# Patient Record
Sex: FEMALE | Race: WHITE | NOT HISPANIC OR LATINO
[De-identification: names, ages, dates, MRNs, and addresses within clinical notes are randomized per-mention and may not be internally consistent; named-entity substitution may affect disease eponyms.]

---

## 2019-04-30 ENCOUNTER — APPOINTMENT (OUTPATIENT)
Dept: ORTHOPEDIC SURGERY | Facility: CLINIC | Age: 54
End: 2019-04-30
Payer: COMMERCIAL

## 2019-04-30 VITALS
BODY MASS INDEX: 25.03 KG/M2 | HEART RATE: 65 BPM | DIASTOLIC BLOOD PRESSURE: 70 MMHG | SYSTOLIC BLOOD PRESSURE: 106 MMHG | HEIGHT: 62 IN | WEIGHT: 136 LBS

## 2019-04-30 DIAGNOSIS — M16.12 UNILATERAL PRIMARY OSTEOARTHRITIS, LEFT HIP: ICD-10-CM

## 2019-04-30 DIAGNOSIS — Z78.9 OTHER SPECIFIED HEALTH STATUS: ICD-10-CM

## 2019-04-30 DIAGNOSIS — Z87.39 PERSONAL HISTORY OF OTHER DISEASES OF THE MUSCULOSKELETAL SYSTEM AND CONNECTIVE TISSUE: ICD-10-CM

## 2019-04-30 PROCEDURE — 99213 OFFICE O/P EST LOW 20 MIN: CPT

## 2019-04-30 PROCEDURE — 73522 X-RAY EXAM HIPS BI 3-4 VIEWS: CPT

## 2019-04-30 RX ORDER — LIOTHYRONINE SODIUM 50 UG/1
TABLET ORAL
Refills: 0 | Status: ACTIVE | COMMUNITY

## 2019-04-30 RX ORDER — DICLOFENAC SODIUM 75 MG/1
75 TABLET, DELAYED RELEASE ORAL
Qty: 30 | Refills: 2 | Status: ACTIVE | COMMUNITY
Start: 2019-04-30 | End: 1900-01-01

## 2019-04-30 NOTE — DISCUSSION/SUMMARY
[Medication Risks Reviewed] : Medication risks reviewed [Surgical risks reviewed] : Surgical risks reviewed [de-identified] : Dr. Mahmood evaluated this patient and is guiding this patient's entire orthopedic management plan. The patient was advised that based upon their clinical presentation and radiographic findings they meet inclusion criteria for benefitting from a left total hip arthroplasty as a treatment option for severe arthritis of the hip.\par The spectrum of treatments for severe hip DJD were reviewed in detail. Dr. Mahmood reviewed in detail the goals, alternatives, risks and benefits of total hip arthroplasty. \par The patient was advised of the possible complications which are inclusive of but not exclusive to VTE-related, infectious-related, anesthetic-related, surgically-related, medically-related, dislocation-related, and implant-related. \par The patient was advised that limb length equality may not be assured secondary to variabilities in pelvic malrotation, stable intraoperative fit, opposite side wear, and other anatomic deformities of the lower extremity.\par A non-cemented type hip implant is utilized after consideration of bony integrity intraoperatively. The patient was demonstrated. A model of the total hip replacement. Advised on the brand and preliminary model of the implant that Dr. Mahmood uses. Patient also understands that the preliminary discharge plan would be for the patient to go home in approximately 3 days unless otherwise not cleared by physical therapy\par \par The patient was advised that Dr. Mahmood operate as a surgical team with my associate Dr. VANESSA Phan.\par The patient was advised that they will require a medical preoperative risk evaluation by their PCP. Further medical subspecialty clearances such as cardiac may be indicated if felt needed by their PCP.\par The patient was advised he/she may call our office after careful consideration of their treatment options and further consultation with any other physician to begin the process of preoperative planning for elective left THR at a time of their choosing. \par \par A long discussion was held with the patient in regards to the total joint replacement. Using a model to demonstrate the procedure. The risks, benefits, and alternatives to surgical intervention were discussed at length with the patient hospitalization and rehabilitation were discussed and the patient was made aware of the prophylaxis with antibiotics and the need for postoperative anticoagulation. Specific risks discussed included infection, wound breakdown, numbness, and damage to nerves, tendons, muscles, arteries and blood vessels. The possibility of recurrent pain, no improvement in pain and actual worsening of pain were also mentioned in conversation with the patient medical complications related to the patient's general medical health, including deep vein thrombosis, pulmonary embolism, heart attack, stroke, death, and other complications from anesthesia or we discussed as well. The patient was told that we will take steps to minimize these risks by using sterile technique antibiotics and DVT prophylaxis. When appropriate and to follow the patient in the clinical setting. Postoperatively to monitor her progress. The benefits of surgery were discussed and the patient was included in the conversation regarding potential for improvement of the current clinical condition. Through operative intervention. Alternatives to surgical intervention, including continued conservative management, which may yield less than optimal results in this particular patient, were discussed. All questions were answered to the satisfaction of the patient the patient will consider scheduling a joint replacement with our surgical booking desk, and we'll contact her office if there are any further questions that we may answer for this particular patient.\par \par  Patient will be started on some physical therapy, and a prescription for a one-day anti-inflammatory diclofenac. The patient was advised to call and book a surgical date and she finds that her activities of daily living are significantly affected more than they are now poor. She sees that the anti-inflammatory medication. Is not improving her daily comfort.\par She informed for a left total hip replacement. Will be centered in the chart. The patient sees that she is ready and it is possible for her to accommodate this with her work and family schedule\par \par

## 2019-04-30 NOTE — PHYSICAL EXAM
[Antalgic] : antalgic [LE] : Sensory: Intact in bilateral lower extremities [DP] : dorsalis pedis 2+ and symmetric bilaterally [PT] : posterior tibial 2+ and symmetric bilaterally [Normal RLE] : Right Lower Extremity: No scars, rashes, lesions, ulcers, skin intact [Normal LLE] : Left Lower Extremity: No scars, rashes, lesions, ulcers, skin intact [Normal Touch] : sensation intact for touch [Normal] : no peripheral adenopathy appreciated [de-identified] : Patient ambulates with a slight pelvic tilt. The left side being down patient has minimal discomfort assessing the examining couch on examination, the right hip has full range of motion flexing beyond 110°, extending fully, with 30° external 10°, internal rotation, 30°, abduction the left hip has discomfort in flexing beyond 100° will extend fully. The patient is limited to about 20° of external rotation 10° of internal rotation and has a positive Abdulaziz examination at about 45° of external rotation. Neurovascular status of the lower extremities is within normal limits. No calf tenderness. Good sensation and pulses at the ankle. Good strength against resistance and EHL and dorsiflexion [de-identified] : AP pelvis and lateral of both hips, and a new office today shows a well-positioned well fit. Right total hip replacement. The left hip shows bone-on-bone wear along the superior lateral border of the acetabulum against the femoral head. There is some wear fraction of the inferior portion of the femoral head from debris, and scuffing. There is particulate material visible in the inferior border of the acetabulum. There is some sclerosis of the superior rim of the acetabulum

## 2019-04-30 NOTE — HISTORY OF PRESENT ILLNESS
[de-identified] : Patient with a history of right total hip replacement September of 2014. Now having intermittent left hip pain for about a year or today for an evaluation and advice regarding possible left total hip replacement [Worsening] : worsening [___ mths] : [unfilled] month(s) ago [2] : an average pain level of 2/10 [1] : a minimum pain level of 1/10 [5] : a maximum pain level of 5/10 [Standing] : standing [Intermit.] : ~He/She~ states the symptoms seem to be intermittent [Walking] : worsened by walking [Recumbency] : relieved by recumbency [Rest] : relieved by rest [de-identified] : Minor relief with OTC anti-inflammatories [de-identified] : Minor relief with OTC anti-inflammatories

## 2019-08-26 ENCOUNTER — APPOINTMENT (OUTPATIENT)
Dept: ORTHOPEDIC SURGERY | Facility: CLINIC | Age: 54
End: 2019-08-26
Payer: COMMERCIAL

## 2019-08-26 VITALS
BODY MASS INDEX: 25.76 KG/M2 | HEIGHT: 62 IN | HEART RATE: 85 BPM | DIASTOLIC BLOOD PRESSURE: 91 MMHG | SYSTOLIC BLOOD PRESSURE: 128 MMHG | WEIGHT: 140 LBS

## 2019-08-26 DIAGNOSIS — M16.11 UNILATERAL PRIMARY OSTEOARTHRITIS, RIGHT HIP: ICD-10-CM

## 2019-08-26 PROCEDURE — 73502 X-RAY EXAM HIP UNI 2-3 VIEWS: CPT

## 2019-08-26 PROCEDURE — 99213 OFFICE O/P EST LOW 20 MIN: CPT

## 2019-08-26 RX ORDER — CELECOXIB 200 MG/1
200 CAPSULE ORAL TWICE DAILY
Qty: 60 | Refills: 0 | Status: ACTIVE | COMMUNITY
Start: 2019-08-26 | End: 1900-01-01

## 2019-10-15 ENCOUNTER — OUTPATIENT (OUTPATIENT)
Dept: OUTPATIENT SERVICES | Facility: HOSPITAL | Age: 54
LOS: 1 days | End: 2019-10-15
Payer: COMMERCIAL

## 2019-10-15 VITALS
RESPIRATION RATE: 14 BRPM | HEART RATE: 78 BPM | DIASTOLIC BLOOD PRESSURE: 80 MMHG | TEMPERATURE: 98 F | SYSTOLIC BLOOD PRESSURE: 120 MMHG | WEIGHT: 147.71 LBS | HEIGHT: 62 IN | OXYGEN SATURATION: 99 %

## 2019-10-15 DIAGNOSIS — K58.9 IRRITABLE BOWEL SYNDROME WITHOUT DIARRHEA: ICD-10-CM

## 2019-10-15 DIAGNOSIS — Z98.811 DENTAL RESTORATION STATUS: Chronic | ICD-10-CM

## 2019-10-15 DIAGNOSIS — Z96.641 PRESENCE OF RIGHT ARTIFICIAL HIP JOINT: Chronic | ICD-10-CM

## 2019-10-15 DIAGNOSIS — Z98.49 CATARACT EXTRACTION STATUS, UNSPECIFIED EYE: Chronic | ICD-10-CM

## 2019-10-15 DIAGNOSIS — Z98.890 OTHER SPECIFIED POSTPROCEDURAL STATES: Chronic | ICD-10-CM

## 2019-10-15 DIAGNOSIS — G43.909 MIGRAINE, UNSPECIFIED, NOT INTRACTABLE, WITHOUT STATUS MIGRAINOSUS: ICD-10-CM

## 2019-10-15 DIAGNOSIS — M19.90 UNSPECIFIED OSTEOARTHRITIS, UNSPECIFIED SITE: ICD-10-CM

## 2019-10-15 DIAGNOSIS — E03.9 HYPOTHYROIDISM, UNSPECIFIED: ICD-10-CM

## 2019-10-15 DIAGNOSIS — Z01.818 ENCOUNTER FOR OTHER PREPROCEDURAL EXAMINATION: ICD-10-CM

## 2019-10-15 DIAGNOSIS — F41.9 ANXIETY DISORDER, UNSPECIFIED: ICD-10-CM

## 2019-10-15 DIAGNOSIS — M16.12 UNILATERAL PRIMARY OSTEOARTHRITIS, LEFT HIP: ICD-10-CM

## 2019-10-15 DIAGNOSIS — Z90.49 ACQUIRED ABSENCE OF OTHER SPECIFIED PARTS OF DIGESTIVE TRACT: Chronic | ICD-10-CM

## 2019-10-15 LAB
ALBUMIN SERPL ELPH-MCNC: 3.9 G/DL — SIGNIFICANT CHANGE UP (ref 3.3–5)
ALP SERPL-CCNC: 61 U/L — SIGNIFICANT CHANGE UP (ref 30–120)
ALT FLD-CCNC: 31 U/L DA — SIGNIFICANT CHANGE UP (ref 10–60)
ANION GAP SERPL CALC-SCNC: 7 MMOL/L — SIGNIFICANT CHANGE UP (ref 5–17)
APTT BLD: 28.5 SEC — SIGNIFICANT CHANGE UP (ref 28.5–37)
AST SERPL-CCNC: 24 U/L — SIGNIFICANT CHANGE UP (ref 10–40)
BILIRUB SERPL-MCNC: 1 MG/DL — SIGNIFICANT CHANGE UP (ref 0.2–1.2)
BLD GP AB SCN SERPL QL: SIGNIFICANT CHANGE UP
BUN SERPL-MCNC: 18 MG/DL — SIGNIFICANT CHANGE UP (ref 7–23)
CALCIUM SERPL-MCNC: 9.6 MG/DL — SIGNIFICANT CHANGE UP (ref 8.4–10.5)
CHLORIDE SERPL-SCNC: 103 MMOL/L — SIGNIFICANT CHANGE UP (ref 96–108)
CO2 SERPL-SCNC: 30 MMOL/L — SIGNIFICANT CHANGE UP (ref 22–31)
CREAT SERPL-MCNC: 0.83 MG/DL — SIGNIFICANT CHANGE UP (ref 0.5–1.3)
GLUCOSE SERPL-MCNC: 117 MG/DL — HIGH (ref 70–99)
INR BLD: 1.11 RATIO — SIGNIFICANT CHANGE UP (ref 0.88–1.16)
POTASSIUM SERPL-MCNC: 4.4 MMOL/L — SIGNIFICANT CHANGE UP (ref 3.5–5.3)
POTASSIUM SERPL-SCNC: 4.4 MMOL/L — SIGNIFICANT CHANGE UP (ref 3.5–5.3)
PROT SERPL-MCNC: 7.5 G/DL — SIGNIFICANT CHANGE UP (ref 6–8.3)
PROTHROM AB SERPL-ACNC: 12.1 SEC — SIGNIFICANT CHANGE UP (ref 10–12.9)
SODIUM SERPL-SCNC: 140 MMOL/L — SIGNIFICANT CHANGE UP (ref 135–145)

## 2019-10-15 PROCEDURE — G0463: CPT

## 2019-10-15 NOTE — H&P PST ADULT - NSICDXPROBLEM_GEN_ALL_CORE_FT
PROBLEM DIAGNOSES  Problem: Osteoarthritis  Assessment and Plan: left hip replacement   Medical clearance in chart   Hospitalist will clear the patient PROBLEM DIAGNOSES  Problem: Osteoarthritis  Assessment and Plan: left hip replacement   Medical clearance in chart   Hospitalist will clear the patient   [facility] on admit

## 2019-10-15 NOTE — H&P PST ADULT - NSICDXPASTSURGICALHX_GEN_ALL_CORE_FT
PAST SURGICAL HISTORY:  History of abdominoplasty 2005    History of augmentation of both breasts 2005    History of cataract extraction bilateral, 2019    History of right hip replacement 2015    Knee injury acl reconstruction 1999 left    S/P cholecystectomy 2013    S/P dental restoration dental implant- Sept 5,2014    Umbilical hernia Repair 1995

## 2019-10-15 NOTE — H&P PST ADULT - NSICDXFAMILYHX_GEN_ALL_CORE_FT
FAMILY HISTORY:  Father  Still living? No  Family history of hyperlipidemia, Age at diagnosis: Age Unknown  Family history of lung cancer, Age at diagnosis: Age Unknown  History of skin cancer of unknown type, Age at diagnosis: Age Unknown    Mother  Still living? No  Family history of hyperlipidemia, Age at diagnosis: Age Unknown  Family history of lung cancer, Age at diagnosis: Age Unknown    Sibling  Still living? Yes, Estimated age: 51-60  Family history of hypertension, Age at diagnosis: Age Unknown

## 2019-10-15 NOTE — CONSULT NOTE ADULT - ASSESSMENT
54 y/o female with past hx of hypothyroidism, HTN, HLD, anxiety, s/p cataract repair, OA and knee replacement presented for pre -op surgical testing

## 2019-10-15 NOTE — CONSULT NOTE ADULT - SUBJECTIVE AND OBJECTIVE BOX
This is a 54 y/o female with past hx of hypothyroidism, HTN, HLD, anxiety, s/p cataract repair, OA and knee replacement presented for pre -op surgical testing. patient is doing well she is up to date on all vaccinations.     MEDICATIONS  (STANDING):    MEDICATIONS  (PRN):      REVIEW OF SYSTEMS:  See HPI,  all others negative    PHYSICAL EXAM:  Vital Signs Last 24 Hrs  T(C): 36.7 (15 Oct 2019 12:02), Max: 36.7 (15 Oct 2019 12:02)  T(F): 98 (15 Oct 2019 12:02), Max: 98 (15 Oct 2019 12:02)  HR: 78 (15 Oct 2019 12:02) (78 - 78)  BP: 120/80 (15 Oct 2019 12:02) (120/80 - 120/80)  BP(mean): 93 (15 Oct 2019 12:02) (93 - 93)  RR: 14 (15 Oct 2019 12:02) (14 - 14)  SpO2: 99% (15 Oct 2019 12:02) (99% - 99%)    GENERAL: NAD, well-groomed, well-developed, awake, alert, oriented x 3, fluent and coherent speech  HEAD:  Atraumatic, Normocephalic  EYES: EOMI, PERRLA, conjunctiva and sclera clear  ENMT: No tonsillar erythema, exudates, or enlargement; Moist mucous membranes, Good dentition, No lesions  NECK: Supple, No JVD, No Cervical LAD, No thyromegaly, No thyroid nodules felt  NERVOUS SYSTEM:  Good concentration; Moving all 4 extremities; No gross sensory deficits, No facial droop  CHEST WALL: No masses  CHEST/LUNG: Clear to auscultation bilaterally; No rales, rhonchi, wheezing, or rubs  HEART: Regular rate and rhythm; No murmurs, rubs, or gallops  ABDOMEN: Soft, Nontender, Nondistended, Bowel sounds present, No palpable masses or organomegaly, No bruits  EXTREMITIES:  2+ Peripheral Pulses, No clubbing, cyanosis, or edema  LYMPH: No lymphadenopathy  SKIN: No rashes or lesions    LABS:    15 Oct 2019 12:35    140    |  103    |  18     ----------------------------<  117    4.4     |  30     |  0.83     Ca    9.6        15 Oct 2019 12:35    TPro  7.5    /  Alb  3.9    /  TBili  1.0    /  DBili  x      /  AST  24     /  ALT  31     /  AlkPhos  61     15 Oct 2019 12:35    PT/INR - ( 15 Oct 2019 12:35 )   PT: 12.1 sec;   INR: 1.11 ratio         PTT - ( 15 Oct 2019 12:35 )  PTT:28.5 sec       RADIOLOGY & ADDITIONAL TESTS:

## 2019-10-15 NOTE — H&P PST ADULT - RS GEN PE MLT RESP DETAILS PC
clear to auscultation bilaterally/breath sounds equal clear to auscultation bilaterally/breath sounds equal/no wheezes/airway obstructed

## 2019-10-15 NOTE — H&P PST ADULT - NEUROLOGICAL DETAILS
alert and oriented x 3/responds to verbal commands/sensation intact/deep reflexes intact/responds to pain

## 2019-10-15 NOTE — H&P PST ADULT - NSANTHOSAYNRD_GEN_A_CORE
No. PATTIE screening performed.  STOP BANG Legend: 0-2 = LOW Risk; 3-4 = INTERMEDIATE Risk; 5-8 = HIGH Risk

## 2019-10-15 NOTE — H&P PST ADULT - MUSCULOSKELETAL
details… detailed exam decreased ROM due to pain/no joint erythema/no calf tenderness/no joint warmth no joint warmth/no calf tenderness/joint erythema/decreased ROM due to pain/joint swelling/no joint erythema

## 2019-10-15 NOTE — H&P PST ADULT - NSICDXPASTMEDICALHX_GEN_ALL_CORE_FT
PAST MEDICAL HISTORY:  Anxiety     Chronic constipation     Hypothyroid     Hypothyroidism     Migraines     Osteoarthritis of left hip PAST MEDICAL HISTORY:  Anxiety     Chronic constipation     Hypothyroidism     IBS (irritable bowel syndrome)     Migraines     Osteoarthritis of left hip

## 2019-10-15 NOTE — H&P PST ADULT - HISTORY OF PRESENT ILLNESS
47yo female presents to PST with chief complaint of right hip osteoarthritis with pain for 4 years. Pain scale is 7/10 right now and 10/10 with stair climbing or laying down.  Pt states that advil helps with the pain sometimes.  she has not tried PT or injections.    The PMH is significant for hypothyroidism, anxiety, depression and migraines.    She is scheduled for "right total hip" replacement on 9/17/14 with Dr Delfino Mahmood. 52 yo female presents to PST with 2 year history of left hip pain with radiation to groin. Reports  wprse pain with stair climbing or laying down.  Pt states that advil helps with the pain sometimes.  scheduled for left hip replacement 54 yo female presents to PST with 2 year history of left hip pain with radiation to groin. Reports constant pain and worse pain with stair climbing and laying down.  Pt states that Advil helps with the pain sometimes.  scheduled for left hip replacement  10/28/19

## 2019-10-16 LAB
MRSA PCR RESULT.: SIGNIFICANT CHANGE UP
S AUREUS DNA NOSE QL NAA+PROBE: SIGNIFICANT CHANGE UP

## 2019-10-19 PROBLEM — K58.9 IRRITABLE BOWEL SYNDROME WITHOUT DIARRHEA: Chronic | Status: ACTIVE | Noted: 2019-10-15

## 2019-10-19 PROBLEM — K59.09 OTHER CONSTIPATION: Chronic | Status: ACTIVE | Noted: 2019-10-10

## 2019-10-19 PROBLEM — M16.12 UNILATERAL PRIMARY OSTEOARTHRITIS, LEFT HIP: Chronic | Status: ACTIVE | Noted: 2019-10-10

## 2019-10-19 PROBLEM — E03.9 HYPOTHYROIDISM, UNSPECIFIED: Chronic | Status: ACTIVE | Noted: 2019-10-10

## 2019-10-23 NOTE — PROGRESS NOTE ADULT - ASSESSMENT
Presurgical evaluation:  1.	IV TXA  2.	Acetaminophen for pain management. Celecoxib 200mg q12h for HO prevention/pain management  3.	Caution if patient requires sumatriptan while here – increased risk of serotonin syndrome with concurrent opiates  4.	Chronic constipation – proactive prevention/treatment with stool softener and laxatives (docusate now nonformulary – pharmacy will have to enter)  5.	VTE prophylaxis per Caprini score

## 2019-10-23 NOTE — PROGRESS NOTE ADULT - SUBJECTIVE AND OBJECTIVE BOX
Admission medication reconciliation/Presurgical evaluation:    No Known Allergies:     Home Medications:   · 	ALPRAZolam 0.25 mg 3 times a day, As needed, anxiety  · 	Sumatriptan (Imitrex) 100 mg once a day, As Needed  · 	levothyroxine 75 mcg once a day  · 	liothyronine 5 mcg once a day  · 	Vitamin D3 50,000 intl units once a week on Wednesdays  · 	Erenumab-aooe (Aimovig) 70 mg/mL subcutaneous once a month – patient will hold until after surgery  · 	Nasacort Allergy 24HR 55 mcg/inh, 2 sprays nasal once a day  · 	vitamin E 400 intl units once a day  · 	Ginkgo Biloba   · 	multivitamin   · 	magnesium [id number] once a day    PAST MEDICAL HISTORY:  Anxiety   Chronic constipation   Hypothyroidism   IBS (irritable bowel syndrome)   Migraines   Osteoarthritis of left hip    PAST SURGICAL HISTORY:  History of abdominoplasty 2005  History of augmentation of both breasts 2005  History of cataract extraction bilateral, 2019  History of right hip replacement 2015  Knee injury acl reconstruction 1999 left  S/P cholecystectomy 2013  S/P dental restoration dental implant- Sept 5,2014  Umbilical hernia Repair 1995    PST values of interest:  SCr 0.83 mg/dL  LFTs WNL

## 2019-10-28 ENCOUNTER — INPATIENT (INPATIENT)
Facility: HOSPITAL | Age: 54
LOS: 1 days | Discharge: HOME | DRG: 470 | End: 2019-10-30
Attending: ORTHOPAEDIC SURGERY | Admitting: ORTHOPAEDIC SURGERY
Payer: COMMERCIAL

## 2019-10-28 ENCOUNTER — TRANSCRIPTION ENCOUNTER (OUTPATIENT)
Age: 54
End: 2019-10-28

## 2019-10-28 ENCOUNTER — RESULT REVIEW (OUTPATIENT)
Age: 54
End: 2019-10-28

## 2019-10-28 ENCOUNTER — APPOINTMENT (OUTPATIENT)
Dept: ORTHOPEDIC SURGERY | Facility: HOSPITAL | Age: 54
End: 2019-10-28

## 2019-10-28 VITALS
OXYGEN SATURATION: 99 % | TEMPERATURE: 98 F | HEART RATE: 71 BPM | WEIGHT: 148.81 LBS | SYSTOLIC BLOOD PRESSURE: 112 MMHG | RESPIRATION RATE: 20 BRPM | DIASTOLIC BLOOD PRESSURE: 70 MMHG | HEIGHT: 63 IN

## 2019-10-28 DIAGNOSIS — Z98.811 DENTAL RESTORATION STATUS: Chronic | ICD-10-CM

## 2019-10-28 DIAGNOSIS — Z98.890 OTHER SPECIFIED POSTPROCEDURAL STATES: Chronic | ICD-10-CM

## 2019-10-28 DIAGNOSIS — Z90.49 ACQUIRED ABSENCE OF OTHER SPECIFIED PARTS OF DIGESTIVE TRACT: Chronic | ICD-10-CM

## 2019-10-28 DIAGNOSIS — Z96.641 PRESENCE OF RIGHT ARTIFICIAL HIP JOINT: Chronic | ICD-10-CM

## 2019-10-28 DIAGNOSIS — M16.12 UNILATERAL PRIMARY OSTEOARTHRITIS, LEFT HIP: ICD-10-CM

## 2019-10-28 DIAGNOSIS — Z98.49 CATARACT EXTRACTION STATUS, UNSPECIFIED EYE: Chronic | ICD-10-CM

## 2019-10-28 DIAGNOSIS — Z01.818 ENCOUNTER FOR OTHER PREPROCEDURAL EXAMINATION: ICD-10-CM

## 2019-10-28 LAB
ANION GAP SERPL CALC-SCNC: 8 MMOL/L — SIGNIFICANT CHANGE UP (ref 5–17)
BUN SERPL-MCNC: 12 MG/DL — SIGNIFICANT CHANGE UP (ref 7–23)
CALCIUM SERPL-MCNC: 9.1 MG/DL — SIGNIFICANT CHANGE UP (ref 8.4–10.5)
CHLORIDE SERPL-SCNC: 105 MMOL/L — SIGNIFICANT CHANGE UP (ref 96–108)
CO2 SERPL-SCNC: 25 MMOL/L — SIGNIFICANT CHANGE UP (ref 22–31)
CREAT SERPL-MCNC: 0.8 MG/DL — SIGNIFICANT CHANGE UP (ref 0.5–1.3)
GLUCOSE SERPL-MCNC: 153 MG/DL — HIGH (ref 70–99)
HCG UR QL: NEGATIVE — SIGNIFICANT CHANGE UP
HCT VFR BLD CALC: 37 % — SIGNIFICANT CHANGE UP (ref 34.5–45)
HGB BLD-MCNC: 11.9 G/DL — SIGNIFICANT CHANGE UP (ref 11.5–15.5)
MCHC RBC-ENTMCNC: 30.8 PG — SIGNIFICANT CHANGE UP (ref 27–34)
MCHC RBC-ENTMCNC: 32.2 GM/DL — SIGNIFICANT CHANGE UP (ref 32–36)
MCV RBC AUTO: 95.9 FL — SIGNIFICANT CHANGE UP (ref 80–100)
NRBC # BLD: 0 /100 WBCS — SIGNIFICANT CHANGE UP (ref 0–0)
PLATELET # BLD AUTO: 198 K/UL — SIGNIFICANT CHANGE UP (ref 150–400)
POTASSIUM SERPL-MCNC: 4.1 MMOL/L — SIGNIFICANT CHANGE UP (ref 3.5–5.3)
POTASSIUM SERPL-SCNC: 4.1 MMOL/L — SIGNIFICANT CHANGE UP (ref 3.5–5.3)
RBC # BLD: 3.86 M/UL — SIGNIFICANT CHANGE UP (ref 3.8–5.2)
RBC # FLD: 13.4 % — SIGNIFICANT CHANGE UP (ref 10.3–14.5)
SODIUM SERPL-SCNC: 138 MMOL/L — SIGNIFICANT CHANGE UP (ref 135–145)
WBC # BLD: 10.39 K/UL — SIGNIFICANT CHANGE UP (ref 3.8–10.5)
WBC # FLD AUTO: 10.39 K/UL — SIGNIFICANT CHANGE UP (ref 3.8–10.5)

## 2019-10-28 PROCEDURE — 99223 1ST HOSP IP/OBS HIGH 75: CPT

## 2019-10-28 PROCEDURE — 27130 TOTAL HIP ARTHROPLASTY: CPT | Mod: 82,LT

## 2019-10-28 PROCEDURE — 88305 TISSUE EXAM BY PATHOLOGIST: CPT | Mod: 26

## 2019-10-28 PROCEDURE — 27130 TOTAL HIP ARTHROPLASTY: CPT | Mod: LT

## 2019-10-28 PROCEDURE — 73502 X-RAY EXAM HIP UNI 2-3 VIEWS: CPT | Mod: 26,LT

## 2019-10-28 PROCEDURE — 88311 DECALCIFY TISSUE: CPT | Mod: 26

## 2019-10-28 RX ORDER — PANTOPRAZOLE SODIUM 20 MG/1
1 TABLET, DELAYED RELEASE ORAL
Qty: 30 | Refills: 1
Start: 2019-10-28 | End: 2019-12-26

## 2019-10-28 RX ORDER — HYDROMORPHONE HYDROCHLORIDE 2 MG/ML
0.5 INJECTION INTRAMUSCULAR; INTRAVENOUS; SUBCUTANEOUS
Refills: 0 | Status: DISCONTINUED | OUTPATIENT
Start: 2019-10-28 | End: 2019-10-28

## 2019-10-28 RX ORDER — LIOTHYRONINE SODIUM 25 UG/1
1 TABLET ORAL
Qty: 0 | Refills: 0 | DISCHARGE

## 2019-10-28 RX ORDER — SENNA PLUS 8.6 MG/1
2 TABLET ORAL
Qty: 0 | Refills: 0 | DISCHARGE
Start: 2019-10-28

## 2019-10-28 RX ORDER — APREPITANT 80 MG/1
40 CAPSULE ORAL ONCE
Refills: 0 | Status: COMPLETED | OUTPATIENT
Start: 2019-10-28 | End: 2019-10-28

## 2019-10-28 RX ORDER — LIOTHYRONINE SODIUM 25 UG/1
5 TABLET ORAL DAILY
Refills: 0 | Status: DISCONTINUED | OUTPATIENT
Start: 2019-10-28 | End: 2019-10-30

## 2019-10-28 RX ORDER — MAGNESIUM CHLORIDE
1500 CRYSTALS MISCELLANEOUS
Qty: 0 | Refills: 0 | DISCHARGE

## 2019-10-28 RX ORDER — ACETAMINOPHEN 500 MG
2 TABLET ORAL
Qty: 0 | Refills: 0 | DISCHARGE
Start: 2019-10-28 | End: 2019-11-11

## 2019-10-28 RX ORDER — PANTOPRAZOLE SODIUM 20 MG/1
40 TABLET, DELAYED RELEASE ORAL
Refills: 0 | Status: DISCONTINUED | OUTPATIENT
Start: 2019-10-28 | End: 2019-10-30

## 2019-10-28 RX ORDER — TRANEXAMIC ACID 100 MG/ML
1000 INJECTION, SOLUTION INTRAVENOUS ONCE
Refills: 0 | Status: COMPLETED | OUTPATIENT
Start: 2019-10-28 | End: 2019-10-28

## 2019-10-28 RX ORDER — ACETAMINOPHEN 500 MG
1000 TABLET ORAL ONCE
Refills: 0 | Status: COMPLETED | OUTPATIENT
Start: 2019-10-28 | End: 2019-10-28

## 2019-10-28 RX ORDER — CELECOXIB 200 MG/1
1 CAPSULE ORAL
Qty: 60 | Refills: 0
Start: 2019-10-28 | End: 2019-11-26

## 2019-10-28 RX ORDER — CHLORHEXIDINE GLUCONATE 213 G/1000ML
1 SOLUTION TOPICAL ONCE
Refills: 0 | Status: COMPLETED | OUTPATIENT
Start: 2019-10-28 | End: 2019-10-28

## 2019-10-28 RX ORDER — CHOLECALCIFEROL (VITAMIN D3) 125 MCG
0 CAPSULE ORAL
Qty: 0 | Refills: 0 | DISCHARGE

## 2019-10-28 RX ORDER — VITAMIN E 100 UNIT
1 CAPSULE ORAL
Qty: 0 | Refills: 0 | DISCHARGE

## 2019-10-28 RX ORDER — ASPIRIN/CALCIUM CARB/MAGNESIUM 324 MG
81 TABLET ORAL EVERY 12 HOURS
Refills: 0 | Status: DISCONTINUED | OUTPATIENT
Start: 2019-10-29 | End: 2019-10-30

## 2019-10-28 RX ORDER — CELECOXIB 200 MG/1
200 CAPSULE ORAL EVERY 12 HOURS
Refills: 0 | Status: DISCONTINUED | OUTPATIENT
Start: 2019-10-28 | End: 2019-10-30

## 2019-10-28 RX ORDER — ALPRAZOLAM 0.25 MG
0.25 TABLET ORAL THREE TIMES A DAY
Refills: 0 | Status: DISCONTINUED | OUTPATIENT
Start: 2019-10-28 | End: 2019-10-30

## 2019-10-28 RX ORDER — ERENUMAB-AOOE 70 MG/ML
0 INJECTION SUBCUTANEOUS
Qty: 0 | Refills: 0 | DISCHARGE

## 2019-10-28 RX ORDER — ACETAMINOPHEN 500 MG
1000 TABLET ORAL EVERY 6 HOURS
Refills: 0 | Status: COMPLETED | OUTPATIENT
Start: 2019-10-28 | End: 2019-10-29

## 2019-10-28 RX ORDER — OXYCODONE HYDROCHLORIDE 5 MG/1
10 TABLET ORAL
Refills: 0 | Status: DISCONTINUED | OUTPATIENT
Start: 2019-10-28 | End: 2019-10-29

## 2019-10-28 RX ORDER — POLYETHYLENE GLYCOL 3350 17 G/17G
17 POWDER, FOR SOLUTION ORAL
Qty: 0 | Refills: 0 | DISCHARGE
Start: 2019-10-28

## 2019-10-28 RX ORDER — SUMATRIPTAN SUCCINATE 4 MG/.5ML
100 INJECTION, SOLUTION SUBCUTANEOUS DAILY
Refills: 0 | Status: DISCONTINUED | OUTPATIENT
Start: 2019-10-28 | End: 2019-10-30

## 2019-10-28 RX ORDER — OXYCODONE HYDROCHLORIDE 5 MG/1
5 TABLET ORAL
Refills: 0 | Status: DISCONTINUED | OUTPATIENT
Start: 2019-10-28 | End: 2019-10-29

## 2019-10-28 RX ORDER — ONDANSETRON 8 MG/1
4 TABLET, FILM COATED ORAL EVERY 6 HOURS
Refills: 0 | Status: DISCONTINUED | OUTPATIENT
Start: 2019-10-28 | End: 2019-10-30

## 2019-10-28 RX ORDER — POLYETHYLENE GLYCOL 3350 17 G/17G
17 POWDER, FOR SOLUTION ORAL AT BEDTIME
Refills: 0 | Status: DISCONTINUED | OUTPATIENT
Start: 2019-10-28 | End: 2019-10-30

## 2019-10-28 RX ORDER — CEFAZOLIN SODIUM 1 G
2000 VIAL (EA) INJECTION EVERY 8 HOURS
Refills: 0 | Status: COMPLETED | OUTPATIENT
Start: 2019-10-28 | End: 2019-10-28

## 2019-10-28 RX ORDER — SODIUM CHLORIDE 9 MG/ML
1000 INJECTION, SOLUTION INTRAVENOUS
Refills: 0 | Status: DISCONTINUED | OUTPATIENT
Start: 2019-10-28 | End: 2019-10-28

## 2019-10-28 RX ORDER — ACETAMINOPHEN 500 MG
1000 TABLET ORAL EVERY 8 HOURS
Refills: 0 | Status: DISCONTINUED | OUTPATIENT
Start: 2019-10-29 | End: 2019-10-30

## 2019-10-28 RX ORDER — GINKGO BILOBA 40 MG
0 CAPSULE ORAL
Qty: 0 | Refills: 0 | DISCHARGE

## 2019-10-28 RX ORDER — CEFAZOLIN SODIUM 1 G
2000 VIAL (EA) INJECTION ONCE
Refills: 0 | Status: COMPLETED | OUTPATIENT
Start: 2019-10-28 | End: 2019-10-28

## 2019-10-28 RX ORDER — MAGNESIUM HYDROXIDE 400 MG/1
30 TABLET, CHEWABLE ORAL DAILY
Refills: 0 | Status: DISCONTINUED | OUTPATIENT
Start: 2019-10-28 | End: 2019-10-30

## 2019-10-28 RX ORDER — HYDROMORPHONE HYDROCHLORIDE 2 MG/ML
0.5 INJECTION INTRAMUSCULAR; INTRAVENOUS; SUBCUTANEOUS
Refills: 0 | Status: DISCONTINUED | OUTPATIENT
Start: 2019-10-28 | End: 2019-10-30

## 2019-10-28 RX ORDER — TRIAMCINOLONE ACETONIDE 55 MCG
2 AEROSOL, SPRAY (GRAM) NASAL
Qty: 0 | Refills: 0 | DISCHARGE

## 2019-10-28 RX ORDER — ASPIRIN/CALCIUM CARB/MAGNESIUM 324 MG
1 TABLET ORAL
Qty: 82 | Refills: 0
Start: 2019-10-28 | End: 2019-12-07

## 2019-10-28 RX ORDER — LEVOTHYROXINE SODIUM 125 MCG
1 TABLET ORAL
Qty: 0 | Refills: 0 | DISCHARGE

## 2019-10-28 RX ORDER — LEVOTHYROXINE SODIUM 125 MCG
75 TABLET ORAL DAILY
Refills: 0 | Status: DISCONTINUED | OUTPATIENT
Start: 2019-10-28 | End: 2019-10-30

## 2019-10-28 RX ORDER — ONDANSETRON 8 MG/1
4 TABLET, FILM COATED ORAL ONCE
Refills: 0 | Status: DISCONTINUED | OUTPATIENT
Start: 2019-10-28 | End: 2019-10-28

## 2019-10-28 RX ORDER — SENNA PLUS 8.6 MG/1
2 TABLET ORAL AT BEDTIME
Refills: 0 | Status: DISCONTINUED | OUTPATIENT
Start: 2019-10-28 | End: 2019-10-30

## 2019-10-28 RX ADMIN — Medication 400 MILLIGRAM(S): at 16:09

## 2019-10-28 RX ADMIN — OXYCODONE HYDROCHLORIDE 10 MILLIGRAM(S): 5 TABLET ORAL at 21:44

## 2019-10-28 RX ADMIN — CELECOXIB 200 MILLIGRAM(S): 200 CAPSULE ORAL at 21:12

## 2019-10-28 RX ADMIN — CHLORHEXIDINE GLUCONATE 1 APPLICATION(S): 213 SOLUTION TOPICAL at 06:15

## 2019-10-28 RX ADMIN — APREPITANT 40 MILLIGRAM(S): 80 CAPSULE ORAL at 06:15

## 2019-10-28 RX ADMIN — Medication 100 MILLIGRAM(S): at 23:50

## 2019-10-28 RX ADMIN — Medication 100 MILLIGRAM(S): at 16:12

## 2019-10-28 RX ADMIN — OXYCODONE HYDROCHLORIDE 10 MILLIGRAM(S): 5 TABLET ORAL at 13:44

## 2019-10-28 RX ADMIN — OXYCODONE HYDROCHLORIDE 10 MILLIGRAM(S): 5 TABLET ORAL at 14:35

## 2019-10-28 RX ADMIN — Medication 400 MILLIGRAM(S): at 21:12

## 2019-10-28 RX ADMIN — OXYCODONE HYDROCHLORIDE 10 MILLIGRAM(S): 5 TABLET ORAL at 17:31

## 2019-10-28 RX ADMIN — HYDROMORPHONE HYDROCHLORIDE 0.5 MILLIGRAM(S): 2 INJECTION INTRAMUSCULAR; INTRAVENOUS; SUBCUTANEOUS at 23:52

## 2019-10-28 RX ADMIN — Medication 1000 MILLIGRAM(S): at 16:11

## 2019-10-28 RX ADMIN — OXYCODONE HYDROCHLORIDE 10 MILLIGRAM(S): 5 TABLET ORAL at 21:14

## 2019-10-28 RX ADMIN — SODIUM CHLORIDE 75 MILLILITER(S): 9 INJECTION, SOLUTION INTRAVENOUS at 10:49

## 2019-10-28 NOTE — DISCHARGE NOTE PROVIDER - INSTRUCTIONS
regular diet  Pain medicine has been prescribed for you, as needed, and it often causes constipation.    For Constipation :   • Increase your water intake. Drink at least 8 glasses of water daily.  • Try adding fiber to your diet by eating fruits, vegetables and foods that are rich in grains.  • If you do experience constipation, you may take an over-the-counter stool softener/laxative such as Colace, Senokot or  Milk of Magnesia. Regular diet  Pain medicine has been prescribed for you, as needed, and it often causes constipation.    For Constipation :   • Increase your water intake. Drink at least 8 glasses of water daily.  • Try adding fiber to your diet by eating fruits, vegetables and foods that are rich in grains.  • If you do experience constipation, you may take an over-the-counter stool softener/laxative such as Colace, Senokot or  Milk of Magnesia.

## 2019-10-28 NOTE — PROGRESS NOTE ADULT - SUBJECTIVE AND OBJECTIVE BOX
EMILIE ROMAN [id number]    Pt is a 54y year old Female s/p left THR. pain is 1/10  Denies chest pain/shortness of breath/nausea/vomitting.     T(C): 36.4 (10-28-19 @ 09:44), Max: 36.5 (10-28-19 @ 06:17)  HR: 71 (10-28-19 @ 11:54) (64 - 76)  BP: 101/68 (10-28-19 @ 13:53) (85/45 - 119/63)  RR: 18 (10-28-19 @ 11:54) (12 - 20)  SpO2: 99% (10-28-19 @ 11:54) (96% - 99%)  Wt(kg): --      10-28 @ 07:01  -  10-28 @ 13:57  --------------------------------------------------------  IN: 1150 mL / OUT: 200 mL / NET: 950 mL        PE: Lungs: clear       Cor: reg rate,rhythm  Left LE: Dressing CDI, SILT distally,  EHL intact PAS on.     A: 54y year old Female s/p left THR POD#0    Plan:   -DVT ASA 81 mg q 12   -PT/OT = OOB  -Hip dislocation precautions  -Pain control   -Medicine to follow   -continue antibiotics as per SCIP protocol  -Follow up Labs  -Incentive spirometry, continue use of PAS

## 2019-10-28 NOTE — DISCHARGE NOTE PROVIDER - NSDCCPCAREPLAN_GEN_ALL_CORE_FT
PRINCIPAL DISCHARGE DIAGNOSIS  Diagnosis: Primary osteoarthritis of left hip  Assessment and Plan of Treatment: Physical Therapy /Occupational Therapy  Total Hip Protocol   - Ambulation  - Transfers   - Stairs   - ADLs (activities of daily living)  Posterior Total Hip Replacement precautions for 4 weeks  - Abduction pillow   - High hip chair for sitting  - No internal rotation,   - No crossing legs   - No sleeping on opposite sides  - Ice packs to hip following Physical Therapy PRINCIPAL DISCHARGE DIAGNOSIS  Diagnosis: Primary osteoarthritis of left hip  Assessment and Plan of Treatment: Physical Therapy /Occupational Therapy  Total Hip Protocol   - Ambulation  - Transfers   - Stairs   - ADLs (activities of daily living)  Posterior Total Hip Replacement precautions for 4 weeks  - Abduction pillow   - High hip chair for sitting  - No internal rotation,   - No crossing legs   - No sleeping on opposite sides  - Ice packs to hip following Physical Therapy  Use silvadene cream on left hip skin tears twice a day.

## 2019-10-28 NOTE — DISCHARGE NOTE PROVIDER - NSDCCPTREATMENT_GEN_ALL_CORE_FT
PRINCIPAL PROCEDURE  Procedure: Total left hip arthroplasty  Findings and Treatment: severe left hip DJD

## 2019-10-28 NOTE — DISCHARGE NOTE PROVIDER - NSDCFUADDINST_GEN_ALL_CORE_FT
- Call your doctor if you experience:  • An increase in pain not controlled by pain medication or change in activity or  position.  • Temperature greater than 101° F.  • Redness, increased swelling or foul smelling drainage from or around the  incision.  • Numbness, tingling or a change in color or temperature of the operative leg.  • Call your doctor immediately if you experience chest pain, shortness of breath or calf pain. - Call your doctor if you experience:  • An increase in pain not controlled by pain medication or change in activity or  position.  • Temperature greater than 101° F.  • Redness, increased swelling or foul smelling drainage from or around the  incision.  • Numbness, tingling or a change in color or temperature of the operative leg.  • Call your doctor immediately if you experience chest pain, shortness of breath or calf pain.    It is advisable to follow up with your primary care provider within the next 2-3 weeks to ensure your medications are appropriate and there are no underlying problems after your procedure.

## 2019-10-28 NOTE — DISCHARGE NOTE PROVIDER - NSDCACTIVITY_GEN_ALL_CORE
Walking - Outdoors allowed/Walking - Indoors allowed/Stairs allowed/No heavy lifting/straining/Do not drive or operate machinery Walking - Outdoors allowed/Stairs allowed/Walking - Indoors allowed/No heavy lifting/straining/Do not drive or operate machinery/Showering allowed

## 2019-10-28 NOTE — CONSULT NOTE ADULT - SUBJECTIVE AND OBJECTIVE BOX
55 yo F with hx of  Hypothyroidism, IBS, anxiety and ,migraines admitted to  for left total hip arthroplasty  Initial History:    Baseline hx::    Perioperative Course: uneventful    As of now: patient doing well        PAST MEDICAL & SURGICAL HISTORY:  IBS (irritable bowel syndrome)  Hypothyroidism  Chronic constipation  Osteoarthritis of left hip  Anxiety  Migraines  History of cataract extraction: bilateral, 2019  History of abdominoplasty: 2005  History of augmentation of both breasts: 2005  History of right hip replacement: 2015  S/P dental restoration: dental implant- Sept 5,2014  S/P cholecystectomy: 2013  Knee injury: acl reconstruction 1999 left  Umbilical hernia: Repair 1995      FAMILY HISTORY:  History of skin cancer of unknown type (Father)  Family history of hypertension (Sibling)  Family history of hyperlipidemia (Father, Mother)  Family history of lung cancer (Father, Mother)      Allergies    No Known Allergies    Intolerances        Review of Systems:  General:denies fever chills, headache, weakness  HEENT: denies blurry vision,diffculty swallowing,  Cardiovascular: denies chest pain  ,palpitatins  Pulmonary:denies shortness of breath, cough, wheezing  Gastrointestinal: denies abdominal pain, constipation, diarrhra  : denies hematuria, dysuria  Neurological: denies weakness, numbness , tingling, dizziness  skin: denies skin rash  Psychiatrical: denies mood disturbances    Home Medications:  Aimovig 70 mg/mL subcutaneous solution: subcutaneous once a month (28 Oct 2019 06:13)  ALPRAZolam 0.25 mg oral tablet: 1 tab(s) orally 3 times a day, As needed, anxiety (28 Oct 2019 06:13)  Ginkgo Biloba oral tablet:  (28 Oct 2019 06:13)  Imitrex 100 mg oral tablet: 1 tab(s) orally once a day, As Needed (28 Oct 2019 06:13)  levothyroxine 75 mcg (0.075 mg) oral tablet: 1 tab(s) orally once a day (28 Oct 2019 06:13)  liothyronine 5 mcg oral tablet: 1 tab(s) orally once a day (28 Oct 2019 06:13)  magnesium chloride: 1500 milligram(s) orally once a day (28 Oct 2019 06:13)  multimineral:  (28 Oct 2019 06:13)  Nasacort Allergy 24HR 55 mcg/inh nasal spray: 2 spray(s) nasal once a day (28 Oct 2019 06:13)  Vitamin D3 50,000 intl units oral capsule: orally once a week (28 Oct 2019 06:13)  vitamin E 400 intl units oral capsule: 1 cap(s) orally once a day (28 Oct 2019 06:13)        Objective:  Vitals  T(C): 36.4 (10-28-19 @ 09:44), Max: 36.5 (10-28-19 @ 06:17)  HR: 71 (10-28-19 @ 11:54) (64 - 76)  BP: 119/63 (10-28-19 @ 11:54) (85/45 - 119/63)  RR: 18 (10-28-19 @ 11:54) (12 - 20)  SpO2: 99% (10-28-19 @ 11:54) (96% - 99%)    Physical Exam:  General: comfortable, no acute distress, well nourished  HEENT: no LAD, trachea midline  Cardiovascular: normal s1s2, no mrg  Pulmonary: CTA Bilaterally, no wheezing , rhonchi  Gastrointestinal: soft non tender non distended, no masses felt  Muscloskeletal: no lower extremity edema  Neurological: CN II-12 intact. No focal weakness  Psychiatrical: normal mood, cooperative    Labs:                    Active Medications  MEDICATIONS  (STANDING):  acetaminophen  IVPB .. 1000 milliGRAM(s) IV Intermittent every 6 hours  ceFAZolin   IVPB 2000 milliGRAM(s) IV Intermittent every 8 hours  celecoxib 200 milliGRAM(s) Oral every 12 hours  levothyroxine 75 MICROGram(s) Oral daily  liothyronine 5 MICROGram(s) Oral daily  pantoprazole    Tablet 40 milliGRAM(s) Oral before breakfast  polyethylene glycol 3350 17 Gram(s) Oral daily    MEDICATIONS  (PRN):  ALPRAZolam 0.25 milliGRAM(s) Oral three times a day PRN anxiety  aluminum hydroxide/magnesium hydroxide/simethicone Suspension 30 milliLiter(s) Oral four times a day PRN Indigestion  HYDROmorphone  Injectable 0.5 milliGRAM(s) IV Push every 3 hours PRN Severe Pain (7 - 10)  magnesium hydroxide Suspension 30 milliLiter(s) Oral daily PRN Constipation  ondansetron Injectable 4 milliGRAM(s) IV Push every 6 hours PRN Nausea and/or Vomiting  oxyCODONE    IR 5 milliGRAM(s) Oral every 3 hours PRN Mild Pain (1 - 3)  oxyCODONE    IR 10 milliGRAM(s) Oral every 3 hours PRN Moderate Pain (4 - 6)  senna 2 Tablet(s) Oral at bedtime PRN Constipation  SUMAtriptan 100 milliGRAM(s) Oral daily PRN Migraine 53 yo F with hx of  Hypothyroidism, IBS, anxiety and ,migraines admitted to  for left total hip arthroplasty  Initial History:    Baseline hx::Dr. Kong. PCP cleared patient    Perioperative Course: uneventful    As of now: patient doing well        PAST MEDICAL & SURGICAL HISTORY:  IBS (irritable bowel syndrome)  Hypothyroidism  Chronic constipation  Osteoarthritis of left hip  Anxiety  Migraines  History of cataract extraction: bilateral, 2019  History of abdominoplasty: 2005  History of augmentation of both breasts: 2005  History of right hip replacement: 2015  S/P dental restoration: dental implant- Sept 5,2014  S/P cholecystectomy: 2013  Knee injury: acl reconstruction 1999 left  Umbilical hernia: Repair 1995      FAMILY HISTORY:  History of skin cancer of unknown type (Father)  Family history of hypertension (Sibling)  Family history of hyperlipidemia (Father, Mother)  Family history of lung cancer (Father, Mother)      Allergies    No Known Allergies    Intolerances        Review of Systems:  General:denies fever chills, headache, weakness  HEENT: denies blurry vision,diffculty swallowing,  Cardiovascular: denies chest pain  ,palpitatins  Pulmonary:denies shortness of breath, cough, wheezing  Gastrointestinal: denies abdominal pain, constipation, diarrhra  : denies hematuria, dysuria  Neurological: denies weakness, numbness , tingling, dizziness  skin: denies skin rash  Psychiatrical: denies mood disturbances    Home Medications:  Aimovig 70 mg/mL subcutaneous solution: subcutaneous once a month (28 Oct 2019 06:13)  ALPRAZolam 0.25 mg oral tablet: 1 tab(s) orally 3 times a day, As needed, anxiety (28 Oct 2019 06:13)  Ginkgo Biloba oral tablet:  (28 Oct 2019 06:13)  Imitrex 100 mg oral tablet: 1 tab(s) orally once a day, As Needed (28 Oct 2019 06:13)  levothyroxine 75 mcg (0.075 mg) oral tablet: 1 tab(s) orally once a day (28 Oct 2019 06:13)  liothyronine 5 mcg oral tablet: 1 tab(s) orally once a day (28 Oct 2019 06:13)  magnesium chloride: 1500 milligram(s) orally once a day (28 Oct 2019 06:13)  multimineral:  (28 Oct 2019 06:13)  Nasacort Allergy 24HR 55 mcg/inh nasal spray: 2 spray(s) nasal once a day (28 Oct 2019 06:13)  Vitamin D3 50,000 intl units oral capsule: orally once a week (28 Oct 2019 06:13)  vitamin E 400 intl units oral capsule: 1 cap(s) orally once a day (28 Oct 2019 06:13)        Objective:  Vitals  T(C): 36.4 (10-28-19 @ 09:44), Max: 36.5 (10-28-19 @ 06:17)  HR: 71 (10-28-19 @ 11:54) (64 - 76)  BP: 119/63 (10-28-19 @ 11:54) (85/45 - 119/63)  RR: 18 (10-28-19 @ 11:54) (12 - 20)  SpO2: 99% (10-28-19 @ 11:54) (96% - 99%)    Physical Exam:  General: comfortable, no acute distress, well nourished  HEENT: no LAD, trachea midline  Cardiovascular: normal s1s2, no mrg  Pulmonary: CTA Bilaterally, no wheezing , rhonchi  Gastrointestinal: soft non tender non distended, no masses felt  Muscloskeletal: no lower extremity edema  Neurological: CN II-12 intact. No focal weakness  Psychiatrical: normal mood, cooperative    Labs:                    Active Medications  MEDICATIONS  (STANDING):  acetaminophen  IVPB .. 1000 milliGRAM(s) IV Intermittent every 6 hours  ceFAZolin   IVPB 2000 milliGRAM(s) IV Intermittent every 8 hours  celecoxib 200 milliGRAM(s) Oral every 12 hours  levothyroxine 75 MICROGram(s) Oral daily  liothyronine 5 MICROGram(s) Oral daily  pantoprazole    Tablet 40 milliGRAM(s) Oral before breakfast  polyethylene glycol 3350 17 Gram(s) Oral daily    MEDICATIONS  (PRN):  ALPRAZolam 0.25 milliGRAM(s) Oral three times a day PRN anxiety  aluminum hydroxide/magnesium hydroxide/simethicone Suspension 30 milliLiter(s) Oral four times a day PRN Indigestion  HYDROmorphone  Injectable 0.5 milliGRAM(s) IV Push every 3 hours PRN Severe Pain (7 - 10)  magnesium hydroxide Suspension 30 milliLiter(s) Oral daily PRN Constipation  ondansetron Injectable 4 milliGRAM(s) IV Push every 6 hours PRN Nausea and/or Vomiting  oxyCODONE    IR 5 milliGRAM(s) Oral every 3 hours PRN Mild Pain (1 - 3)  oxyCODONE    IR 10 milliGRAM(s) Oral every 3 hours PRN Moderate Pain (4 - 6)  senna 2 Tablet(s) Oral at bedtime PRN Constipation  SUMAtriptan 100 milliGRAM(s) Oral daily PRN Migraine

## 2019-10-28 NOTE — CONSULT NOTE ADULT - ASSESSMENT
53 yo F with hx of  Hypothyroidism, IBS, anxiety and ,migraines admitted to  for left total hip arthroplasty    s/p left hip arthroplasty POD day 0  WBAT  PT.OT  early ambulation  Pain management  Incentive spirometry      ALPRAZolam 0.25 mg oral tablet: 1 tab(s) orally 3 times a day, As needed, anxiety (28 Oct 2019 06:13)    Hx of Migraines  Imitrex 100 PRN    Hx of Hypothyroidism  levothyroxine 75 mcg (0.075 mg) oral tablet: 1 tab(s) orally once a day (28 Oct 2019 06:13)      Vitamin D/E deficiency  Vitamin D3 50,000 intl units oral capsule: orally once a week (28 Oct 2019 06:13)  vitamin E 400 intl units oral capsule: 1 cap(s) orally once a day (28 Oct 2019 06:13)      diet: advance as tolerated  dvt ppx: as per ortho 55 yo F with hx of  Hypothyroidism, IBS, anxiety and ,migraines admitted to [facility] for left total hip arthroplasty    s/p left hip arthroplasty POD day 0  WBAT  PT.OT  early ambulation  Pain management  Incentive spirometry      ALPRAZolam 0.25 mg oral tablet: 1 tab(s) orally 3 times a day, As needed, anxiety (28 Oct 2019 06:13)    Hx of Migraines  Imitrex 100 PRN    Hx of Hypothyroidism  Patient on two medications:    levothyroxine 75 mcg (0.075 mg) oral tablet: 1 tab(s) orally once a day (28 Oct 2019 06:13)  and liothyronine 5mcg  patients needs both medications  Asked  to bring in liothhyronine for the am   Vitamin D/E deficiency  Vitamin D3 50,000 intl units oral capsule  vitamin E 400 intl units oral capsule  patient also on Vitamin C    diet: advance as tolerated  dvt ppx: as per ortho

## 2019-10-28 NOTE — DISCHARGE NOTE PROVIDER - CARE PROVIDER_API CALL
Delfino Mahmood)  Orthopaedic Surgery  [address], [address]  [place] [address]  Phone: [phone]  Fax: [phone]  Follow Up Time: 2 weeks

## 2019-10-28 NOTE — DISCHARGE NOTE PROVIDER - NSDCFUSCHEDAPPT_GEN_ALL_CORE_FT
EMILIE ROMAN ; 11/11/2019 ; [address]  EMILIE ROMAN ; 12/17/2019 ; [address] EMILIE ROMAN ; 11/11/2019 ; [address]  EMILIE ROMAN ; 12/17/2019 ; [address] EMILIE ROMAN ; 11/11/2019 ; [address]  EMILIE ROMAN ; 12/17/2019 ; [address]

## 2019-10-28 NOTE — DISCHARGE NOTE PROVIDER - HOSPITAL COURSE
This patient was admitted to [facility] with a history of severe degenerative joint disease of the left hip.  Patient went to Pre-Surgical Testing at [facility] and was medically cleared to undergo elective procedure. Patient underwent left THR by Dr. Delfino Mahmood on 10/28/19. Procedure was well tolerated.  No operative or vincent-operative complications arose during patients hospital course.  Patient received antibiotic according to SCIP guidelines for infection prevention.  Aspirin was given for DVT prophylaxis.  Anesthesia, Medical Hospitalist, Physical Therapy and Occupational Therapy were consulted. Patient is stable for discharge with a good prognosis.  Appropriate discharge instructions and medications are provided in this document. This 55yo female patient was admitted to [facility] with a history of severe degenerative joint disease of the left hip.  Patient went to Pre-Surgical Testing at [facility] and was medically cleared to undergo elective procedure. Patient underwent left THR by Dr. Delfino Mahmood on 10/28/19. Procedure was well tolerated.  No operative or vincent-operative complications arose during patient's hospital course.  Patient received antibiotic according to SCIP guidelines for infection prevention.  Aspirin was given for DVT prophylaxis.  Anesthesia, Medical Hospitalist, Physical Therapy and Occupational Therapy were consulted. Patient is stable for discharge home with home care services and a good prognosis on 10/30/19.  Appropriate discharge instructions and medications are provided in this document.

## 2019-10-28 NOTE — PHYSICAL THERAPY INITIAL EVALUATION ADULT - ADDITIONAL COMMENTS
Lives in apt with spouse.  1 step to enter without railing; none inside.  Owns walker, cane, commode

## 2019-10-29 ENCOUNTER — TRANSCRIPTION ENCOUNTER (OUTPATIENT)
Age: 54
End: 2019-10-29

## 2019-10-29 LAB
ANION GAP SERPL CALC-SCNC: 6 MMOL/L — SIGNIFICANT CHANGE UP (ref 5–17)
BUN SERPL-MCNC: 13 MG/DL — SIGNIFICANT CHANGE UP (ref 7–23)
CALCIUM SERPL-MCNC: 8.7 MG/DL — SIGNIFICANT CHANGE UP (ref 8.4–10.5)
CHLORIDE SERPL-SCNC: 105 MMOL/L — SIGNIFICANT CHANGE UP (ref 96–108)
CO2 SERPL-SCNC: 27 MMOL/L — SIGNIFICANT CHANGE UP (ref 22–31)
CREAT SERPL-MCNC: 0.77 MG/DL — SIGNIFICANT CHANGE UP (ref 0.5–1.3)
GLUCOSE SERPL-MCNC: 118 MG/DL — HIGH (ref 70–99)
HCT VFR BLD CALC: 31.1 % — LOW (ref 34.5–45)
HGB BLD-MCNC: 10.1 G/DL — LOW (ref 11.5–15.5)
MCHC RBC-ENTMCNC: 31.4 PG — SIGNIFICANT CHANGE UP (ref 27–34)
MCHC RBC-ENTMCNC: 32.5 GM/DL — SIGNIFICANT CHANGE UP (ref 32–36)
MCV RBC AUTO: 96.6 FL — SIGNIFICANT CHANGE UP (ref 80–100)
NRBC # BLD: 0 /100 WBCS — SIGNIFICANT CHANGE UP (ref 0–0)
PLATELET # BLD AUTO: 173 K/UL — SIGNIFICANT CHANGE UP (ref 150–400)
POTASSIUM SERPL-MCNC: 4 MMOL/L — SIGNIFICANT CHANGE UP (ref 3.5–5.3)
POTASSIUM SERPL-SCNC: 4 MMOL/L — SIGNIFICANT CHANGE UP (ref 3.5–5.3)
RBC # BLD: 3.22 M/UL — LOW (ref 3.8–5.2)
RBC # FLD: 13.3 % — SIGNIFICANT CHANGE UP (ref 10.3–14.5)
SODIUM SERPL-SCNC: 138 MMOL/L — SIGNIFICANT CHANGE UP (ref 135–145)
WBC # BLD: 7.6 K/UL — SIGNIFICANT CHANGE UP (ref 3.8–10.5)
WBC # FLD AUTO: 7.6 K/UL — SIGNIFICANT CHANGE UP (ref 3.8–10.5)

## 2019-10-29 PROCEDURE — 99233 SBSQ HOSP IP/OBS HIGH 50: CPT

## 2019-10-29 RX ORDER — HYDROMORPHONE HYDROCHLORIDE 2 MG/ML
1 INJECTION INTRAMUSCULAR; INTRAVENOUS; SUBCUTANEOUS
Qty: 42 | Refills: 0
Start: 2019-10-29 | End: 2019-11-04

## 2019-10-29 RX ORDER — HYDROMORPHONE HYDROCHLORIDE 2 MG/ML
2 INJECTION INTRAMUSCULAR; INTRAVENOUS; SUBCUTANEOUS
Refills: 0 | Status: DISCONTINUED | OUTPATIENT
Start: 2019-10-29 | End: 2019-10-30

## 2019-10-29 RX ORDER — HYDROMORPHONE HYDROCHLORIDE 2 MG/ML
4 INJECTION INTRAMUSCULAR; INTRAVENOUS; SUBCUTANEOUS
Refills: 0 | Status: DISCONTINUED | OUTPATIENT
Start: 2019-10-29 | End: 2019-10-30

## 2019-10-29 RX ADMIN — CELECOXIB 200 MILLIGRAM(S): 200 CAPSULE ORAL at 21:28

## 2019-10-29 RX ADMIN — CELECOXIB 200 MILLIGRAM(S): 200 CAPSULE ORAL at 09:08

## 2019-10-29 RX ADMIN — HYDROMORPHONE HYDROCHLORIDE 4 MILLIGRAM(S): 2 INJECTION INTRAMUSCULAR; INTRAVENOUS; SUBCUTANEOUS at 16:37

## 2019-10-29 RX ADMIN — Medication 1000 MILLIGRAM(S): at 09:03

## 2019-10-29 RX ADMIN — Medication 1000 MILLIGRAM(S): at 18:14

## 2019-10-29 RX ADMIN — HYDROMORPHONE HYDROCHLORIDE 0.5 MILLIGRAM(S): 2 INJECTION INTRAMUSCULAR; INTRAVENOUS; SUBCUTANEOUS at 00:07

## 2019-10-29 RX ADMIN — LIOTHYRONINE SODIUM 5 MICROGRAM(S): 25 TABLET ORAL at 06:28

## 2019-10-29 RX ADMIN — CELECOXIB 200 MILLIGRAM(S): 200 CAPSULE ORAL at 09:38

## 2019-10-29 RX ADMIN — Medication 75 MICROGRAM(S): at 06:28

## 2019-10-29 RX ADMIN — Medication 1000 MILLIGRAM(S): at 09:33

## 2019-10-29 RX ADMIN — HYDROMORPHONE HYDROCHLORIDE 4 MILLIGRAM(S): 2 INJECTION INTRAMUSCULAR; INTRAVENOUS; SUBCUTANEOUS at 13:32

## 2019-10-29 RX ADMIN — HYDROMORPHONE HYDROCHLORIDE 4 MILLIGRAM(S): 2 INJECTION INTRAMUSCULAR; INTRAVENOUS; SUBCUTANEOUS at 19:59

## 2019-10-29 RX ADMIN — HYDROMORPHONE HYDROCHLORIDE 4 MILLIGRAM(S): 2 INJECTION INTRAMUSCULAR; INTRAVENOUS; SUBCUTANEOUS at 16:07

## 2019-10-29 RX ADMIN — Medication 1000 MILLIGRAM(S): at 17:44

## 2019-10-29 RX ADMIN — HYDROMORPHONE HYDROCHLORIDE 0.5 MILLIGRAM(S): 2 INJECTION INTRAMUSCULAR; INTRAVENOUS; SUBCUTANEOUS at 23:16

## 2019-10-29 RX ADMIN — CELECOXIB 200 MILLIGRAM(S): 200 CAPSULE ORAL at 21:30

## 2019-10-29 RX ADMIN — Medication 1000 MILLIGRAM(S): at 04:26

## 2019-10-29 RX ADMIN — PANTOPRAZOLE SODIUM 40 MILLIGRAM(S): 20 TABLET, DELAYED RELEASE ORAL at 06:28

## 2019-10-29 RX ADMIN — POLYETHYLENE GLYCOL 3350 17 GRAM(S): 17 POWDER, FOR SOLUTION ORAL at 06:29

## 2019-10-29 RX ADMIN — Medication 400 MILLIGRAM(S): at 03:32

## 2019-10-29 RX ADMIN — HYDROMORPHONE HYDROCHLORIDE 0.5 MILLIGRAM(S): 2 INJECTION INTRAMUSCULAR; INTRAVENOUS; SUBCUTANEOUS at 22:46

## 2019-10-29 RX ADMIN — Medication 81 MILLIGRAM(S): at 17:44

## 2019-10-29 RX ADMIN — HYDROMORPHONE HYDROCHLORIDE 4 MILLIGRAM(S): 2 INJECTION INTRAMUSCULAR; INTRAVENOUS; SUBCUTANEOUS at 10:04

## 2019-10-29 RX ADMIN — Medication 81 MILLIGRAM(S): at 06:28

## 2019-10-29 RX ADMIN — HYDROMORPHONE HYDROCHLORIDE 0.5 MILLIGRAM(S): 2 INJECTION INTRAMUSCULAR; INTRAVENOUS; SUBCUTANEOUS at 06:57

## 2019-10-29 RX ADMIN — HYDROMORPHONE HYDROCHLORIDE 4 MILLIGRAM(S): 2 INJECTION INTRAMUSCULAR; INTRAVENOUS; SUBCUTANEOUS at 19:29

## 2019-10-29 RX ADMIN — HYDROMORPHONE HYDROCHLORIDE 4 MILLIGRAM(S): 2 INJECTION INTRAMUSCULAR; INTRAVENOUS; SUBCUTANEOUS at 13:02

## 2019-10-29 RX ADMIN — HYDROMORPHONE HYDROCHLORIDE 4 MILLIGRAM(S): 2 INJECTION INTRAMUSCULAR; INTRAVENOUS; SUBCUTANEOUS at 09:34

## 2019-10-29 RX ADMIN — HYDROMORPHONE HYDROCHLORIDE 0.5 MILLIGRAM(S): 2 INJECTION INTRAMUSCULAR; INTRAVENOUS; SUBCUTANEOUS at 06:27

## 2019-10-29 NOTE — PROGRESS NOTE ADULT - ASSESSMENT
53 yo F with hx of  Hypothyroidism, IBS, anxiety and ,migraines admitted to  for left total hip arthroplasty    s/p left hip arthroplasty POD day 1  WBAT  PT.OT  early ambulation  Pain management  Incentive spirometry      ALPRAZolam 0.25 mg oral tablet: 1 tab(s) orally 3 times a day, As needed, anxiety (28 Oct 2019 06:13)    Hx of Migraines  Imitrex 100 PRN    Hx of Hypothyroidism  Patient on two medications:    levothyroxine 75 mcg (0.075 mg) oral tablet: 1 tab(s) orally once a day (28 Oct 2019 06:13)  and liothyronine 5mcg  patients needs both medications  Asked  to bring in liothhyronine for the am     Vitamin D/E deficiency  Vitamin D3 50,000 intl units oral capsule  vitamin E 400 intl units oral capsule  patient also on Vitamin C    diet: advance as tolerated  dvt ppx: as per ortho      DOing well. medically stable

## 2019-10-29 NOTE — PROGRESS NOTE ADULT - SUBJECTIVE AND OBJECTIVE BOX
Seen and examined at bedside. doing well            Review of Systems:  General:denies fever chills, headache, weakness  HEENT: denies blurry vision,diffculty swallowing,  Cardiovascular: denies chest pain  ,palpitatins  Pulmonary:denies shortness of breath, cough, wheezing  Gastrointestinal: denies abdominal pain, constipation, diarrhra  : denies hematuria, dysuria  Neurological: denies weakness, numbness , tingling, dizziness  skin: denies skin rash  Psychiatrical: denies mood disturbances            Objective:  Vitals  T(C): 36.8 (10-29-19 @ 11:13), Max: 36.9 (10-28-19 @ 15:11)  HR: 76 (10-29-19 @ 11:13) (71 - 83)  BP: 99/65 (10-29-19 @ 11:13) (90/58 - 119/63)  RR: 16 (10-29-19 @ 11:13) (16 - 18)  SpO2: 97% (10-29-19 @ 11:13) (95% - 99%)    Physical Exam:  General: comfortable, no acute distress, well nourished  HEENT: no LAD, trachea midline  Cardiovascular: normal s1s2, no mrg  Pulmonary: CTA Bilaterally, no wheezing , rhonchi  Gastrointestinal: soft non tender non distended, no masses felt  Muscloskeletal: no lower extremity edema  Neurological: CN II-12 intact. No focal weakness  Psychiatrical: normal mood, cooperative    Labs:                          10.1   7.60  )-----------( 173      ( 29 Oct 2019 07:13 )             31.1     10-29    138  |  105  |  13  ----------------------------<  118<H>  4.0   |  27  |  0.77    Ca    8.7      29 Oct 2019 07:13              Active Medications  MEDICATIONS  (STANDING):  acetaminophen   Tablet .. 1000 milliGRAM(s) Oral every 8 hours  aspirin enteric coated 81 milliGRAM(s) Oral every 12 hours  celecoxib 200 milliGRAM(s) Oral every 12 hours  levothyroxine 75 MICROGram(s) Oral daily  liothyronine 5 MICROGram(s) Oral daily  pantoprazole    Tablet 40 milliGRAM(s) Oral before breakfast  polyethylene glycol 3350 17 Gram(s) Oral at bedtime    MEDICATIONS  (PRN):  ALPRAZolam 0.25 milliGRAM(s) Oral three times a day PRN anxiety  aluminum hydroxide/magnesium hydroxide/simethicone Suspension 30 milliLiter(s) Oral four times a day PRN Indigestion  HYDROmorphone   Tablet 2 milliGRAM(s) Oral every 3 hours PRN Mild Pain (1 - 3)  HYDROmorphone   Tablet 4 milliGRAM(s) Oral every 3 hours PRN Moderate Pain (4 - 6)  HYDROmorphone  Injectable 0.5 milliGRAM(s) IV Push every 3 hours PRN Severe Pain (7 - 10)  magnesium hydroxide Suspension 30 milliLiter(s) Oral daily PRN Constipation  ondansetron Injectable 4 milliGRAM(s) IV Push every 6 hours PRN Nausea and/or Vomiting  senna 2 Tablet(s) Oral at bedtime PRN Constipation  SUMAtriptan 100 milliGRAM(s) Oral daily PRN Migraine      Impression    Plan:    Acute Hypoxemic Respiratory Failure  HOB elevation >30 degrees  Maintain 02 sat >92 percent  Incentive Spirometry      Acute Blood Loss Anemia  Maintain Active Type and Scree  Tranfuse Hb >7      DVT ppx: heparin subq  Code status: Full  Diet : Regular        Signed   True Desai M.D Seen and examined at bedside. doing well        Review of Systems:  General:denies fever chills, headache, weakness  HEENT: denies blurry vision,diffculty swallowing,  Cardiovascular: denies chest pain  ,palpitatins  Pulmonary:denies shortness of breath, cough, wheezing  Gastrointestinal: denies abdominal pain, constipation, diarrhra  : denies hematuria, dysuria  Neurological: denies weakness, numbness , tingling, dizziness  skin: denies skin rash  Psychiatrical: denies mood disturbances            Objective:  Vitals  T(C): 36.8 (10-29-19 @ 11:13), Max: 36.9 (10-28-19 @ 15:11)  HR: 76 (10-29-19 @ 11:13) (71 - 83)  BP: 99/65 (10-29-19 @ 11:13) (90/58 - 119/63)  RR: 16 (10-29-19 @ 11:13) (16 - 18)  SpO2: 97% (10-29-19 @ 11:13) (95% - 99%)    Physical Exam:  General: comfortable, no acute distress, well nourished  HEENT: no LAD, trachea midline  Cardiovascular: normal s1s2, no mrg  Pulmonary: CTA Bilaterally, no wheezing , rhonchi  Gastrointestinal: soft non tender non distended, no masses felt  Muscloskeletal: no lower extremity edema  Neurological: CN II-12 intact. No focal weakness  Psychiatrical: normal mood, cooperative    Labs:                          10.1   7.60  )-----------( 173      ( 29 Oct 2019 07:13 )             31.1     10-29    138  |  105  |  13  ----------------------------<  118<H>  4.0   |  27  |  0.77    Ca    8.7      29 Oct 2019 07:13              Active Medications  MEDICATIONS  (STANDING):  acetaminophen   Tablet .. 1000 milliGRAM(s) Oral every 8 hours  aspirin enteric coated 81 milliGRAM(s) Oral every 12 hours  celecoxib 200 milliGRAM(s) Oral every 12 hours  levothyroxine 75 MICROGram(s) Oral daily  liothyronine 5 MICROGram(s) Oral daily  pantoprazole    Tablet 40 milliGRAM(s) Oral before breakfast  polyethylene glycol 3350 17 Gram(s) Oral at bedtime    MEDICATIONS  (PRN):  ALPRAZolam 0.25 milliGRAM(s) Oral three times a day PRN anxiety  aluminum hydroxide/magnesium hydroxide/simethicone Suspension 30 milliLiter(s) Oral four times a day PRN Indigestion  HYDROmorphone   Tablet 2 milliGRAM(s) Oral every 3 hours PRN Mild Pain (1 - 3)  HYDROmorphone   Tablet 4 milliGRAM(s) Oral every 3 hours PRN Moderate Pain (4 - 6)  HYDROmorphone  Injectable 0.5 milliGRAM(s) IV Push every 3 hours PRN Severe Pain (7 - 10)  magnesium hydroxide Suspension 30 milliLiter(s) Oral daily PRN Constipation  ondansetron Injectable 4 milliGRAM(s) IV Push every 6 hours PRN Nausea and/or Vomiting  senna 2 Tablet(s) Oral at bedtime PRN Constipation  SUMAtriptan 100 milliGRAM(s) Oral daily PRN Migraine

## 2019-10-29 NOTE — DISCHARGE NOTE NURSING/CASE MANAGEMENT/SOCIAL WORK - PATIENT PORTAL LINK FT
You can access the FollowMyHealth Patient Portal offered by [facility] by registering at the following website: [email or web address]. By joining [employer]’s FollowMyHealth portal, you will also be able to view your health information using other applications (apps) compatible with our system.

## 2019-10-29 NOTE — PROGRESS NOTE ADULT - SUBJECTIVE AND OBJECTIVE BOX
ORTHOPEDIC PA PROGRESS NOTE  EMILIE ROMAN      54y Female                                 SY 2WST 221 02                                                                                                                           POD #    1d    STATUS POST:       Procedure: Hip replacement, total, left             Patient seen and examined at bedside.      Current Pain Management:    acetaminophen   Tablet .. 1000 milliGRAM(s) Oral every 8 hours  ALPRAZolam 0.25 milliGRAM(s) Oral three times a day PRN  celecoxib 200 milliGRAM(s) Oral every 12 hours  HYDROmorphone  Injectable 0.5 milliGRAM(s) IV Push every 3 hours PRN  ondansetron Injectable 4 milliGRAM(s) IV Push every 6 hours PRN  oxyCODONE    IR 5 milliGRAM(s) Oral every 3 hours PRN  oxyCODONE    IR 10 milliGRAM(s) Oral every 3 hours PRN  SUMAtriptan 100 milliGRAM(s) Oral daily PRN      T(F): 98.5  HR: 74  BP: 92/58  RR: 16  SpO2: 95%                         10.1   7.60  )-----------( 173      ( 29 Oct 2019 07:13 )             31.1         10-29    138  |  105  |  13  ----------------------------<  118<H>  4.0   |  27  |  0.77    Ca    8.7      29 Oct 2019 07:13      10-28-19 @ 07:01  -  10-29-19 @ 07:00  --------------------------------------------------------  IN:    lactated ringers.: 950 mL    Oral Fluid: 200 mL  Total IN: 1150 mL    OUT:    Estimated Blood Loss: 200 mL    Voided: 750 mL  Total OUT: 950 mL    Total NET: 200 mL        Physical Exam :    -   Dressing C/D/I.   -   Distal Neurvascular status intact grossly.   -   Warm well perfused; capillary refill <3 seconds   -   (+)EHL/FHL   -   (+) Sensation to light touch  -   (-) Calf tenderness Bilaterally      A/P: 54y Female s/p Hip replacement, total, left     -   Ortho Stable  -   Pain control:  acetaminophen   Tablet .. 1000 milliGRAM(s) Oral every 8 hours  ALPRAZolam 0.25 milliGRAM(s) Oral three times a day PRN  celecoxib 200 milliGRAM(s) Oral every 12 hours  HYDROcodone 5 mG/acetaminophen 500 mG 1 Tablet(s) Oral every 4 hours PRN  HYDROmorphone  Injectable 0.5 milliGRAM(s) IV Push every 3 hours PRN  ondansetron Injectable 4 milliGRAM(s) IV Push every 6 hours PRN  oxyCODONE    IR 5 milliGRAM(s) Oral every 3 hours PRN  oxyCODONE    IR 10 milliGRAM(s) Oral every 3 hours PRN  SUMAtriptan 100 milliGRAM(s) Oral daily PRN  -   Change oxy to Dilaudid  -   Medicine to follow  -   DVT ppx:    PAS +  aspirin enteric coated: 81 milliGRAM(s) Oral  -   PT/OT OOB,  Weight bearing status: WBAT   -  Dispo:  Home  -   Prescribed Medications:  aspirin 81 mg oral delayed release tablet: 1 tab orally every 12 hours. Take 2 hours before Celebrex  celecoxib 200 mg oral capsule: 1 cap orally every 12 hours. Take 2 hours after aspirin  pantoprazole 40 mg oral delayed release tablet: 1 tab(s) orally once a day (before a meal)

## 2019-10-30 VITALS
SYSTOLIC BLOOD PRESSURE: 92 MMHG | RESPIRATION RATE: 14 BRPM | DIASTOLIC BLOOD PRESSURE: 59 MMHG | OXYGEN SATURATION: 94 % | HEART RATE: 77 BPM | TEMPERATURE: 98 F

## 2019-10-30 LAB
ANION GAP SERPL CALC-SCNC: 7 MMOL/L — SIGNIFICANT CHANGE UP (ref 5–17)
BUN SERPL-MCNC: 16 MG/DL — SIGNIFICANT CHANGE UP (ref 7–23)
CALCIUM SERPL-MCNC: 8.8 MG/DL — SIGNIFICANT CHANGE UP (ref 8.4–10.5)
CHLORIDE SERPL-SCNC: 105 MMOL/L — SIGNIFICANT CHANGE UP (ref 96–108)
CO2 SERPL-SCNC: 27 MMOL/L — SIGNIFICANT CHANGE UP (ref 22–31)
CREAT SERPL-MCNC: 0.7 MG/DL — SIGNIFICANT CHANGE UP (ref 0.5–1.3)
GLUCOSE SERPL-MCNC: 101 MG/DL — HIGH (ref 70–99)
HCT VFR BLD CALC: 31.4 % — LOW (ref 34.5–45)
HGB BLD-MCNC: 10 G/DL — LOW (ref 11.5–15.5)
MCHC RBC-ENTMCNC: 31.1 PG — SIGNIFICANT CHANGE UP (ref 27–34)
MCHC RBC-ENTMCNC: 31.8 GM/DL — LOW (ref 32–36)
MCV RBC AUTO: 97.5 FL — SIGNIFICANT CHANGE UP (ref 80–100)
NRBC # BLD: 0 /100 WBCS — SIGNIFICANT CHANGE UP (ref 0–0)
PLATELET # BLD AUTO: 152 K/UL — SIGNIFICANT CHANGE UP (ref 150–400)
POTASSIUM SERPL-MCNC: 4.1 MMOL/L — SIGNIFICANT CHANGE UP (ref 3.5–5.3)
POTASSIUM SERPL-SCNC: 4.1 MMOL/L — SIGNIFICANT CHANGE UP (ref 3.5–5.3)
RBC # BLD: 3.22 M/UL — LOW (ref 3.8–5.2)
RBC # FLD: 13.6 % — SIGNIFICANT CHANGE UP (ref 10.3–14.5)
SODIUM SERPL-SCNC: 139 MMOL/L — SIGNIFICANT CHANGE UP (ref 135–145)
WBC # BLD: 6.9 K/UL — SIGNIFICANT CHANGE UP (ref 3.8–10.5)
WBC # FLD AUTO: 6.9 K/UL — SIGNIFICANT CHANGE UP (ref 3.8–10.5)

## 2019-10-30 PROCEDURE — 85027 COMPLETE CBC AUTOMATED: CPT

## 2019-10-30 PROCEDURE — C1776: CPT

## 2019-10-30 PROCEDURE — 88311 DECALCIFY TISSUE: CPT

## 2019-10-30 PROCEDURE — C1713: CPT

## 2019-10-30 PROCEDURE — 97530 THERAPEUTIC ACTIVITIES: CPT

## 2019-10-30 PROCEDURE — 97116 GAIT TRAINING THERAPY: CPT

## 2019-10-30 PROCEDURE — 36415 COLL VENOUS BLD VENIPUNCTURE: CPT

## 2019-10-30 PROCEDURE — 73502 X-RAY EXAM HIP UNI 2-3 VIEWS: CPT

## 2019-10-30 PROCEDURE — 80048 BASIC METABOLIC PNL TOTAL CA: CPT

## 2019-10-30 PROCEDURE — 99239 HOSP IP/OBS DSCHRG MGMT >30: CPT

## 2019-10-30 PROCEDURE — 97110 THERAPEUTIC EXERCISES: CPT

## 2019-10-30 PROCEDURE — 94664 DEMO&/EVAL PT USE INHALER: CPT

## 2019-10-30 PROCEDURE — 97535 SELF CARE MNGMENT TRAINING: CPT

## 2019-10-30 PROCEDURE — 81025 URINE PREGNANCY TEST: CPT

## 2019-10-30 PROCEDURE — 88305 TISSUE EXAM BY PATHOLOGIST: CPT

## 2019-10-30 PROCEDURE — 97165 OT EVAL LOW COMPLEX 30 MIN: CPT

## 2019-10-30 PROCEDURE — 97161 PT EVAL LOW COMPLEX 20 MIN: CPT

## 2019-10-30 RX ADMIN — HYDROMORPHONE HYDROCHLORIDE 4 MILLIGRAM(S): 2 INJECTION INTRAMUSCULAR; INTRAVENOUS; SUBCUTANEOUS at 11:20

## 2019-10-30 RX ADMIN — HYDROMORPHONE HYDROCHLORIDE 4 MILLIGRAM(S): 2 INJECTION INTRAMUSCULAR; INTRAVENOUS; SUBCUTANEOUS at 03:57

## 2019-10-30 RX ADMIN — Medication 1000 MILLIGRAM(S): at 10:00

## 2019-10-30 RX ADMIN — CELECOXIB 200 MILLIGRAM(S): 200 CAPSULE ORAL at 10:00

## 2019-10-30 RX ADMIN — Medication 1000 MILLIGRAM(S): at 09:37

## 2019-10-30 RX ADMIN — CELECOXIB 200 MILLIGRAM(S): 200 CAPSULE ORAL at 09:37

## 2019-10-30 RX ADMIN — LIOTHYRONINE SODIUM 5 MICROGRAM(S): 25 TABLET ORAL at 06:05

## 2019-10-30 RX ADMIN — HYDROMORPHONE HYDROCHLORIDE 4 MILLIGRAM(S): 2 INJECTION INTRAMUSCULAR; INTRAVENOUS; SUBCUTANEOUS at 04:27

## 2019-10-30 RX ADMIN — Medication 75 MICROGRAM(S): at 06:05

## 2019-10-30 RX ADMIN — Medication 1 APPLICATION(S): at 15:46

## 2019-10-30 RX ADMIN — HYDROMORPHONE HYDROCHLORIDE 4 MILLIGRAM(S): 2 INJECTION INTRAMUSCULAR; INTRAVENOUS; SUBCUTANEOUS at 08:10

## 2019-10-30 RX ADMIN — HYDROMORPHONE HYDROCHLORIDE 4 MILLIGRAM(S): 2 INJECTION INTRAMUSCULAR; INTRAVENOUS; SUBCUTANEOUS at 07:38

## 2019-10-30 RX ADMIN — PANTOPRAZOLE SODIUM 40 MILLIGRAM(S): 20 TABLET, DELAYED RELEASE ORAL at 06:05

## 2019-10-30 RX ADMIN — Medication 81 MILLIGRAM(S): at 06:05

## 2019-10-30 RX ADMIN — POLYETHYLENE GLYCOL 3350 17 GRAM(S): 17 POWDER, FOR SOLUTION ORAL at 06:09

## 2019-10-30 RX ADMIN — HYDROMORPHONE HYDROCHLORIDE 4 MILLIGRAM(S): 2 INJECTION INTRAMUSCULAR; INTRAVENOUS; SUBCUTANEOUS at 10:50

## 2019-10-30 RX ADMIN — HYDROMORPHONE HYDROCHLORIDE 4 MILLIGRAM(S): 2 INJECTION INTRAMUSCULAR; INTRAVENOUS; SUBCUTANEOUS at 14:12

## 2019-10-30 RX ADMIN — HYDROMORPHONE HYDROCHLORIDE 4 MILLIGRAM(S): 2 INJECTION INTRAMUSCULAR; INTRAVENOUS; SUBCUTANEOUS at 14:43

## 2019-10-30 NOTE — PROGRESS NOTE ADULT - ASSESSMENT
POD#2 s/p LEFT THR  - Pain controlled with dilaudid, oxycodone did not work for her  - PT/OT  - Bowel regimen  - VTE PPx - ASA BID  - stable for discharge from medical perspective    Hx of Migraines  - Imitrex 100 PRN    Hx of Hypothyroidism  - levothyroxine 75 mcg (0.075 mg) oral tablet: 1 tab(s) orally once a day (28 Oct 2019 06:13)  and liothyronine 5mcg  - resume home regimen upon discharge

## 2019-10-30 NOTE — PROGRESS NOTE ADULT - SUBJECTIVE AND OBJECTIVE BOX
Orthopedic P.A.- POD# 2 - s/p Left THR    Patient alert and comfortable in bed with oral meds for pain control.  Denies hip pain or nausea.    Vital Signs Last 24 Hrs  T(C): 36.8 (30 Oct 2019 08:00), Max: 36.9 (29 Oct 2019 23:21)  T(F): 98.3 (30 Oct 2019 08:00), Max: 98.5 (29 Oct 2019 23:21)  HR: 77 (30 Oct 2019 08:00) (60 - 77)  BP: 92/59 (30 Oct 2019 08:00) (90/50 - 110/76)  BP(mean): --  RR: 14 (30 Oct 2019 08:00) (14 - 16)  SpO2: 94% (30 Oct 2019 08:00) (94% - 98%)         I&O's Detail               Labs:                          10.0<L>  6.90  )-----------( 152      ( 30 Oct 2019 07:17 )             31.4<L>  30 Oct 2019 07:17                 30 Oct 2019 07:17    139    |  105    |  16     ----------------------------<  101<H>  4.1     |  27     |  0.70     Ca    8.8        30 Oct 2019 07:17                    MEDICATIONS:acetaminophen   Tablet .. 1000 milliGRAM(s) Oral every 8 hours  ALPRAZolam 0.25 milliGRAM(s) Oral three times a day PRN  aluminum hydroxide/magnesium hydroxide/simethicone Suspension 30 milliLiter(s) Oral four times a day PRN  aspirin enteric coated 81 milliGRAM(s) Oral every 12 hours  bisacodyl Suppository 10 milliGRAM(s) Rectal daily PRN  celecoxib 200 milliGRAM(s) Oral every 12 hours  HYDROmorphone   Tablet 2 milliGRAM(s) Oral every 3 hours PRN  HYDROmorphone   Tablet 4 milliGRAM(s) Oral every 3 hours PRN  HYDROmorphone  Injectable 0.5 milliGRAM(s) IV Push every 3 hours PRN  levothyroxine 75 MICROGram(s) Oral daily  liothyronine 5 MICROGram(s) Oral daily  magnesium hydroxide Suspension 30 milliLiter(s) Oral daily PRN  ondansetron Injectable 4 milliGRAM(s) IV Push every 6 hours PRN  pantoprazole    Tablet 40 milliGRAM(s) Oral before breakfast  polyethylene glycol 3350 17 Gram(s) Oral at bedtime  senna 2 Tablet(s) Oral at bedtime PRN  SUMAtriptan 100 milliGRAM(s) Oral daily PRN    Anticoagulation:  aspirin enteric coated 81 milliGRAM(s) Oral every 12 hours            Pain medications:   acetaminophen   Tablet .. 1000 milliGRAM(s) Oral every 8 hours  ALPRAZolam 0.25 milliGRAM(s) Oral three times a day PRN  celecoxib 200 milliGRAM(s) Oral every 12 hours  HYDROmorphone   Tablet 2 milliGRAM(s) Oral every 3 hours PRN  HYDROmorphone   Tablet 4 milliGRAM(s) Oral every 3 hours PRN  HYDROmorphone  Injectable 0.5 milliGRAM(s) IV Push every 3 hours PRN  ondansetron Injectable 4 milliGRAM(s) IV Push every 6 hours PRN  SUMAtriptan 100 milliGRAM(s) Oral daily PRN                                       Physical Exam:  Left hip- Abduction pillow in place.  Primary surgical bandage removed and Prineo tape noted dry and intact over surgical wound.  Neurovascular grossly intact LE's.  EHL/ant.tibs 5/5.  PAS on LE's.  Calves soft and non-tender.                                                                                                                                                        A/P:  Orthopedically stable.  -Continue pain management with above plan.  -DVT prophylaxis with Ecotrin 81m po every 12 hours for 6 weeks.  -Labs OK today.  -PT/OT cleared patient for safe ambulation and stairs today. Posterior total hip precautions reinforced.  -Dr. Daugherty for medical cleared patient for discharge home today,  -Further plan as per attendings.

## 2019-10-30 NOTE — PHARMACOTHERAPY INTERVENTION NOTE - COMMENTS
Admission medication reconciliation POD1
Transition of Care education at bedside - medication calendar given to patient
Presurgical evaluation for postoperative medication management. Team emailed. Note placed in [facility].

## 2019-10-30 NOTE — PROGRESS NOTE ADULT - REASON FOR ADMISSION
Patient is a 54y old  Female who presents with a chief complaint of Left total hip replacement (29 Oct 2019 11:33)

## 2019-10-30 NOTE — PROGRESS NOTE ADULT - SUBJECTIVE AND OBJECTIVE BOX
INTERVAL HPI/OVERNIGHT EVENTS:   Patient seen and examined.  Eating, voiding, had BMs.  Walked with PT into hallway yesterday.  Pain controlled overall.  No fevers, chills, sweats, dizziness, HA, changes in vision, cp, palpitations, sob, persistent cough, n/v/d, abd pain, dysuria, focal weakness, or calf pain.       REVIEW OF SYSTEMS:  See HPI,  all others negative    PHYSICAL EXAM:  Vital Signs Last 24 Hrs  T(C): 36.8 (30 Oct 2019 08:00), Max: 36.9 (29 Oct 2019 23:21)  T(F): 98.3 (30 Oct 2019 08:00), Max: 98.5 (29 Oct 2019 23:21)  HR: 77 (30 Oct 2019 08:00) (60 - 77)  BP: 92/59 (30 Oct 2019 08:00) (90/50 - 110/76)  BP(mean): --  RR: 14 (30 Oct 2019 08:00) (14 - 16)  SpO2: 94% (30 Oct 2019 08:00) (94% - 98%)    GENERAL: NAD, well-groomed, well-developed, awake, alert, oriented x 3, fluent and coherent speech  EYES: EOMI, PERRLA, conjunctiva and sclera clear  ENMT: No tonsillar erythema, exudates, or enlargement; Moist mucous membranes, Good dentition, No lesions  NECK: Supple, No JVD, No Cervical LAD, No thyromegaly, No thyroid nodules felt  NERVOUS SYSTEM:  Good concentration; Moving all 4 extremities; No gross sensory deficits, No facial droop  CHEST/LUNG: Clear to auscultation bilaterally; No rales, rhonchi, wheezing, or rubs  HEART: Regular rate and rhythm; No murmurs, rubs, or gallops  ABDOMEN: Soft, Nontender, Nondistended, Bowel sounds present, No palpable masses or organomegaly, No bruits  EXTREMITIES:  2+ Peripheral Pulses, No clubbing, cyanosis, or edema, no calf tenderness in either leg  INCISION: wound clean, dry, intact, minimal surrounding edema/erythema    Diagnostic Testing:                        10.0   6.90  )-----------( 152      ( 30 Oct 2019 07:17 )             31.4     30 Oct 2019 07:17    139    |  105    |  16     ----------------------------<  101    4.1     |  27     |  0.70     Ca    8.8        30 Oct 2019 07:17

## 2019-11-11 ENCOUNTER — APPOINTMENT (OUTPATIENT)
Dept: ORTHOPEDIC SURGERY | Facility: CLINIC | Age: 54
End: 2019-11-11
Payer: COMMERCIAL

## 2019-11-11 PROCEDURE — 99024 POSTOP FOLLOW-UP VISIT: CPT

## 2019-11-11 PROCEDURE — 73502 X-RAY EXAM HIP UNI 2-3 VIEWS: CPT

## 2019-12-17 ENCOUNTER — APPOINTMENT (OUTPATIENT)
Dept: ORTHOPEDIC SURGERY | Facility: CLINIC | Age: 54
End: 2019-12-17
Payer: COMMERCIAL

## 2019-12-17 VITALS
HEIGHT: 62 IN | SYSTOLIC BLOOD PRESSURE: 118 MMHG | DIASTOLIC BLOOD PRESSURE: 76 MMHG | BODY MASS INDEX: 25.76 KG/M2 | HEART RATE: 70 BPM | WEIGHT: 140 LBS

## 2019-12-17 PROCEDURE — 99024 POSTOP FOLLOW-UP VISIT: CPT

## 2019-12-17 PROCEDURE — 73502 X-RAY EXAM HIP UNI 2-3 VIEWS: CPT | Mod: LT

## 2020-09-27 NOTE — DISCHARGE NOTE NURSING/CASE MANAGEMENT/SOCIAL WORK - NSDPDISTO_GEN_ALL_CORE
Encounter Date: 9/27/2020       History     Chief Complaint   Patient presents with    Knee Injury     I GOT A helmet to my right knee 2 days ago      A 14-year-old male presents with right knee pain x3 days.  States he was struck by a football helmet during a scrimmage.  He was unable to finish the scrimmage  and apply ice to his knee.        Review of patient's allergies indicates:  No Known Allergies  No past medical history on file.  No past surgical history on file.  No family history on file.  Social History     Tobacco Use    Smoking status: Not on file   Substance Use Topics    Alcohol use: Not on file    Drug use: Not on file     Review of Systems   Constitutional: Negative for fever.   HENT: Negative for sore throat.    Respiratory: Negative for shortness of breath.    Cardiovascular: Negative for chest pain.   Gastrointestinal: Negative for nausea.   Genitourinary: Negative for dysuria.   Musculoskeletal: Negative for back pain.        Right knee pain   Skin: Negative for rash.   Neurological: Negative for weakness.   Hematological: Does not bruise/bleed easily.       Physical Exam     Initial Vitals [09/27/20 1445]   BP Pulse Resp Temp SpO2   (!) 107/50 60 18 97.8 °F (36.6 °C) 100 %      MAP       --         Physical Exam    Constitutional: He appears well-developed and well-nourished. He is not diaphoretic. No distress.   HENT:   Head: Normocephalic and atraumatic.   Eyes: Conjunctivae and EOM are normal. Pupils are equal, round, and reactive to light.   Neck: Normal range of motion. Neck supple.   Cardiovascular: Normal rate, regular rhythm, normal heart sounds and intact distal pulses.   Pulmonary/Chest: Breath sounds normal.   Musculoskeletal: Normal range of motion. Tenderness present.      Right knee: He exhibits normal range of motion, no swelling, no effusion, no ecchymosis, no deformity, no laceration, no erythema and normal alignment.      Comments: Right knee tenderness. He ambulates with  a slight limp.   Neurological: He is alert and oriented to person, place, and time. He has normal strength.   Skin: Skin is warm and dry. Capillary refill takes less than 2 seconds.         ED Course   Procedures  Labs Reviewed - No data to display       Imaging Results          X-Ray Knee 3 View Right (In process)                                    ED Course as of Sep 27 1648   Sun Sep 27, 2020   1612 Nothing acute.   X-Ray Knee 3 View Right [JS]   1634 X-ray results reviewed with patient and mother.    [JS]      ED Course User Index  [JS] Shar Rojo NP            Clinical Impression:       ICD-10-CM ICD-9-CM   1. Acute pain of right knee  M25.561 719.46   2. Knee pain  M25.569 719.46                          ED Disposition Condition    Discharge Stable        ED Prescriptions     Medication Sig Dispense Start Date End Date Auth. Provider    diclofenac (VOLTAREN) 50 MG EC tablet Take 1 tablet (50 mg total) by mouth 2 (two) times daily. 20 tablet 9/27/2020  Shar Rojo NP        Follow-up Information     Follow up With Specialties Details Why Contact Info    [facility] Psychology, Internal Medicine, Gynecology, Dental General Practice Schedule an appointment as soon as possible for a visit in 3 days  [address] [facility] [id number]  [phone]                                         Shar Rojo NP  09/27/20 0283     Home with home care

## 2021-04-22 NOTE — H&P PST ADULT - MEDICATION HERBAL REMEDIES, PROFILE
Placed call to Kadi with a reclassification of her VUS result. Left a voicemail and callback number. Kadi returned my call and we discussed the following:     Kadi was found to have a DICER1 VUS, which is now classified as likely benign by [employer]. This is good news and means that Kadi's results can be considered completely negative.    The reclassification notice will be sent to scanned records and mailed to the patient with a letter explaining the result.      
no

## 2021-06-07 NOTE — H&P PST ADULT - SYMPTOMS
Impression: S/P Cataract Extraction by phacoemulsification with IOL placement; DEXYCU OD - 32 Days. Presence of intraocular lens  Z96.1. Plan: monitor --Advised patient to use artificial tears for comfort. none

## 2022-02-25 ENCOUNTER — NON-APPOINTMENT (OUTPATIENT)
Age: 57
End: 2022-02-25

## 2022-02-25 ENCOUNTER — APPOINTMENT (OUTPATIENT)
Dept: ORTHOPEDIC SURGERY | Facility: CLINIC | Age: 57
End: 2022-02-25
Payer: COMMERCIAL

## 2022-02-25 VITALS — HEART RATE: 72 BPM | DIASTOLIC BLOOD PRESSURE: 77 MMHG | SYSTOLIC BLOOD PRESSURE: 112 MMHG

## 2022-02-25 DIAGNOSIS — Z96.649 PRESENCE OF UNSPECIFIED ARTIFICIAL HIP JOINT: ICD-10-CM

## 2022-02-25 DIAGNOSIS — M25.552 PAIN IN LEFT HIP: ICD-10-CM

## 2022-02-25 PROCEDURE — 99213 OFFICE O/P EST LOW 20 MIN: CPT

## 2022-02-25 PROCEDURE — 73502 X-RAY EXAM HIP UNI 2-3 VIEWS: CPT | Mod: LT

## 2022-02-25 RX ORDER — MELOXICAM 15 MG/1
15 TABLET ORAL
Qty: 14 | Refills: 0 | Status: ACTIVE | COMMUNITY
Start: 2022-02-25 | End: 1900-01-01

## 2022-02-25 RX ORDER — HYDROMORPHONE HYDROCHLORIDE 4 MG/1
4 TABLET ORAL
Qty: 84 | Refills: 0 | Status: DISCONTINUED | COMMUNITY
Start: 2019-11-11 | End: 2022-02-25

## 2022-02-25 NOTE — REASON FOR VISIT
[Follow-Up Visit] : a follow-up visit for [Spouse] : spouse [Artificial Hip Joint] : artificial hip joint [Hip Pain] : hip pain [FreeTextEntry2] : Left hip pain x 1 year.  S/p left total hip replacement 10/28/19.

## 2022-02-25 NOTE — DISCUSSION/SUMMARY
[Medication Risks Reviewed] : Medication risks reviewed [de-identified] : Physical exam and radiograph findings were reviewed with the patient in detail.  Patient was advised to continue with her current level of activities avoiding those that may aggravate her symptoms.  Physical therapy was prescribed for the patient for 6 to 8 weeks as well as meloxicam to decrease inflammation.  She was advised to follow-up in 4 to 6 weeks if her symptoms do not improve, or sooner if her symptoms worsen.  At that time may need to obtain more imaging such as MRI for further evaluation of her symptoms.. The patient verbalized understanding of all instructions.  All of her questions were addressed to her satisfaction.  She was advised to call the office at anytime with new questions or concerns.

## 2022-02-25 NOTE — PHYSICAL EXAM
[LE] : Sensory: Intact in bilateral lower extremities [de-identified] : The patient is alert and oriented x4, in no apparent distress.  She has mild stable but antalgic gait ambulating without assistive device.  The left lateral hip is with well-healed surgical scar without swelling, erythema or bruising.  No palpable tenderness, hematoma or effusion.  The patient has full active range of motion of the left hip expressing minimal discomfort with internal rotation of the hip.  No calf tenderness on palpation. Positive distal pulses bilaterally. [de-identified] : 1 view of the left hip and pelvis were obtained. X-rays reveal a well-positioned, well fit, total hip replacement in excellent alignment. No obvious evidence of fractures, dislocation or osteolysis noted.

## 2022-02-25 NOTE — HISTORY OF PRESENT ILLNESS
[Worsening] : worsening [de-identified] : The patient is a 56-year-old female who presents today in company of her  for evaluation of the left hip pain that has been on and off for approximately 1 year, progressively getting worse. The patient is status post left total hip replacement performed by Dr. Guerin on  12/17/2019.  The patient denies any fall, injuries or trauma to the left hip.  Patient denies groin pain.  Denies swelling in the hip.  States that she is able to ambulate on the extremity "but sometimes feels as if the hip is unstable".  She reports pain of 8/10. She has been utilizing Aleve alternating with Advil as needed with some relief.. The patient denies fevers, chills or generalized malaise.  She denies numbness, tingling or weakness of the left lower extremity.

## 2023-05-16 NOTE — H&P PST ADULT - NSANTHTIREDRD_ENT_A_CORE
PRE-SEDATION ASSESSMENT    CONSENT  Risks, benefits, and alternatives have been discussed with the patient/patient representative, and patient/patient representative agrees to proceed: Yes    MEDICAL HISTORY  Significant medical/surgical history: No    PHYSICAL EXAM  History and Physical Reviewed: Patient has valid H&P within 30 days. I have reviewed and there are no changes.  Airway Anatomy : Class II  Heart : Normal  Lungs : Normal  LOC/Mental Status : Normal    OTHER FINDINGS       SEDATION RISK ASSESSMENT  Risk Status ASA: Class II - Normal patient with mild systemic disease  Plan for Sedation: Moderate Sedation  Indications for Procedure/Pre-Procedure Diagnosis and Planned Procedure: pain    NARRATIVE FINDINGS     
No

## 2023-06-26 NOTE — CONSULT NOTE ADULT - CONSULT REQUESTED DATE/TIME
15-Oct-2019 17:35 PROCEDURES:  Max procedure 26-Jun-2023 13:07:39 Cleft lift/Pilonidal cyst excision Elliot Esquivel

## 2023-08-30 NOTE — OCCUPATIONAL THERAPY INITIAL EVALUATION ADULT - ASR WT BEARING STATUS EVAL
Left LE Cyclophosphamide Pregnancy And Lactation Text: This medication is Pregnancy Category D and it isn't considered safe during pregnancy. This medication is excreted in breast milk.